# Patient Record
Sex: MALE | Race: WHITE | NOT HISPANIC OR LATINO | Employment: UNEMPLOYED | URBAN - NONMETROPOLITAN AREA
[De-identification: names, ages, dates, MRNs, and addresses within clinical notes are randomized per-mention and may not be internally consistent; named-entity substitution may affect disease eponyms.]

---

## 2017-11-05 ENCOUNTER — HOSPITAL ENCOUNTER (EMERGENCY)
Facility: HOSPITAL | Age: 42
Discharge: HOME | End: 2017-11-05
Attending: FAMILY MEDICINE | Admitting: FAMILY MEDICINE

## 2017-11-05 VITALS
HEIGHT: 65 IN | WEIGHT: 150 LBS | RESPIRATION RATE: 16 BRPM | DIASTOLIC BLOOD PRESSURE: 104 MMHG | OXYGEN SATURATION: 96 % | SYSTOLIC BLOOD PRESSURE: 190 MMHG | HEART RATE: 77 BPM | TEMPERATURE: 98 F | BODY MASS INDEX: 24.99 KG/M2

## 2017-11-05 DIAGNOSIS — S51.812A FOREARM LACERATION, LEFT, INITIAL ENCOUNTER: Primary | ICD-10-CM

## 2017-11-05 DIAGNOSIS — I10 HYPERTENSION, UNSPECIFIED TYPE: ICD-10-CM

## 2017-11-05 PROCEDURE — 99283 EMERGENCY DEPT VISIT LOW MDM: CPT

## 2017-11-05 RX ORDER — LIDOCAINE HYDROCHLORIDE 10 MG/ML
10 INJECTION, SOLUTION EPIDURAL; INFILTRATION; INTRACAUDAL; PERINEURAL ONCE
Status: COMPLETED | OUTPATIENT
Start: 2017-11-05 | End: 2017-11-05

## 2017-11-05 RX ORDER — CLONIDINE HYDROCHLORIDE 0.1 MG/1
0.1 TABLET ORAL ONCE
Status: COMPLETED | OUTPATIENT
Start: 2017-11-05 | End: 2017-11-05

## 2017-11-05 RX ADMIN — LIDOCAINE HYDROCHLORIDE 10 ML: 10 INJECTION, SOLUTION EPIDURAL; INFILTRATION; INTRACAUDAL; PERINEURAL at 20:38

## 2017-11-05 RX ADMIN — CLONIDINE HYDROCHLORIDE 0.1 MG: 0.1 TABLET ORAL at 20:38

## 2017-11-06 NOTE — ED PROVIDER NOTES
Subjective   Patient is a 42 y.o. male presenting with skin laceration.   History provided by:  Patient   used: No    Laceration   Location:  Shoulder/arm  Shoulder/arm laceration location:  L forearm  Length:  1 inch  Depth:  Through dermis  Quality: jagged    Bleeding: controlled with pressure    Time since incident:  1 hour  Injury mechanism: lost balance and fell into corner of mirror.  Pain details:     Quality:  Dull    Severity:  Moderate    Timing:  Constant    Progression:  Unchanged  Foreign body present:  No foreign bodies  Relieved by:  None tried  Worsened by:  Nothing  Ineffective treatments:  None tried  Tetanus status:  Up to date  Associated symptoms: no fever, no rash, no redness and no swelling        Review of Systems   Constitutional: Negative for activity change and fever.   HENT: Negative for congestion and sore throat.    Eyes: Negative for pain.   Respiratory: Negative for cough, shortness of breath and wheezing.    Cardiovascular: Negative for chest pain.   Gastrointestinal: Negative for abdominal distention, diarrhea, nausea and vomiting.   Genitourinary: Negative for difficulty urinating and dysuria.   Musculoskeletal: Negative for arthralgias and myalgias.   Skin: Positive for wound. Negative for rash.   Neurological: Negative for dizziness and headaches.   Psychiatric/Behavioral: Negative for agitation.   All other systems reviewed and are negative.      Past Medical History:   Diagnosis Date   • Hypertension        Allergies   Allergen Reactions   • Codeine        History reviewed. No pertinent surgical history.    History reviewed. No pertinent family history.    Social History     Social History   • Marital status:      Spouse name: N/A   • Number of children: N/A   • Years of education: N/A     Social History Main Topics   • Smoking status: Never Smoker   • Smokeless tobacco: Never Used   • Alcohol use No   • Drug use: No   • Sexual activity: Not Asked      Other Topics Concern   • None     Social History Narrative   • None           Objective   Physical Exam   Constitutional: He is oriented to person, place, and time. He appears well-developed and well-nourished.   HENT:   Head: Normocephalic and atraumatic.   Eyes: EOM are normal. Pupils are equal, round, and reactive to light.   Neck: Normal range of motion. Neck supple.   Cardiovascular: Normal rate, regular rhythm and normal heart sounds.    Pulmonary/Chest: Effort normal and breath sounds normal.   Abdominal: Soft. Bowel sounds are normal.   Musculoskeletal: Normal range of motion.   Neurological: He is alert and oriented to person, place, and time.   Skin: Skin is warm and dry. Laceration noted.        approx 1in laceration to volar aspect of mid left forearm. N/V intact.    Psychiatric: He has a normal mood and affect. His behavior is normal. Judgment and thought content normal.   Nursing note and vitals reviewed.      Laceration repair  Date/Time: 11/5/2017 9:27 PM  Performed by: CHRIS WHITAKER  Authorized by: WARREN BROWN   Consent: Verbal consent obtained. Written consent obtained.  Risks and benefits: risks, benefits and alternatives were discussed  Consent given by: patient  Patient understanding: patient states understanding of the procedure being performed  Body area: upper extremity  Location details: left lower arm  Laceration length: 2.6 cm  Foreign bodies: no foreign bodies  Tendon involvement: none  Nerve involvement: none  Vascular damage: no  Anesthesia: local infiltration    Anesthesia:  Local Anesthetic: lidocaine 1% without epinephrine    Sedation:  Patient sedated: no  Irrigation solution: saline  Irrigation method: syringe  Amount of cleaning: standard  Debridement: none  Degree of undermining: none  Skin closure: 4-0 nylon  Number of sutures: 7  Technique: simple  Approximation: close  Approximation difficulty: simple  Dressing: 4x4 sterile gauze and gauze roll  Patient  tolerance: Patient tolerated the procedure well with no immediate complications               ED Course  ED Course                  MDM  Number of Diagnoses or Management Options  Forearm laceration, left, initial encounter:   Hypertension, unspecified type:      Amount and/or Complexity of Data Reviewed  Clinical lab tests: ordered and reviewed  Tests in the radiology section of CPT®: reviewed and ordered  Tests in the medicine section of CPT®: reviewed and ordered    Patient Progress  Patient progress: stable      Final diagnoses:   Forearm laceration, left, initial encounter   Hypertension, unspecified type            RUFINO Crooks  11/05/17 9171

## 2018-09-11 ENCOUNTER — APPOINTMENT (OUTPATIENT)
Dept: GENERAL RADIOLOGY | Facility: HOSPITAL | Age: 43
End: 2018-09-11

## 2018-09-11 ENCOUNTER — APPOINTMENT (OUTPATIENT)
Dept: CT IMAGING | Facility: HOSPITAL | Age: 43
End: 2018-09-11

## 2018-09-11 ENCOUNTER — HOSPITAL ENCOUNTER (EMERGENCY)
Facility: HOSPITAL | Age: 43
Discharge: HOME | End: 2018-09-11
Attending: EMERGENCY MEDICINE | Admitting: EMERGENCY MEDICINE

## 2018-09-11 VITALS
BODY MASS INDEX: 24.99 KG/M2 | TEMPERATURE: 98 F | HEART RATE: 79 BPM | HEIGHT: 65 IN | RESPIRATION RATE: 16 BRPM | DIASTOLIC BLOOD PRESSURE: 99 MMHG | OXYGEN SATURATION: 99 % | WEIGHT: 150 LBS | SYSTOLIC BLOOD PRESSURE: 153 MMHG

## 2018-09-11 DIAGNOSIS — I10 HYPERTENSION, UNSPECIFIED TYPE: Primary | ICD-10-CM

## 2018-09-11 LAB
6-ACETYL MORPHINE: NEGATIVE
ALBUMIN SERPL-MCNC: 4.5 G/DL (ref 3.5–5)
ALBUMIN/GLOB SERPL: 1.3 G/DL (ref 1.5–2.5)
ALP SERPL-CCNC: 89 U/L (ref 40–129)
ALT SERPL W P-5'-P-CCNC: 25 U/L (ref 10–44)
AMPHET+METHAMPHET UR QL: POSITIVE
ANION GAP SERPL CALCULATED.3IONS-SCNC: 9.5 MMOL/L (ref 3.6–11.2)
AST SERPL-CCNC: 28 U/L (ref 10–34)
BARBITURATES UR QL SCN: NEGATIVE
BASOPHILS # BLD AUTO: 0.05 10*3/MM3 (ref 0–0.3)
BASOPHILS NFR BLD AUTO: 0.4 % (ref 0–2)
BENZODIAZ UR QL SCN: NEGATIVE
BILIRUB SERPL-MCNC: 0.4 MG/DL (ref 0.2–1.8)
BILIRUB UR QL STRIP: NEGATIVE
BUN BLD-MCNC: 15 MG/DL (ref 7–21)
BUN/CREAT SERPL: 14.6 (ref 7–25)
BUPRENORPHINE SERPL-MCNC: NEGATIVE NG/ML
CALCIUM SPEC-SCNC: 9.8 MG/DL (ref 7.7–10)
CANNABINOIDS SERPL QL: NEGATIVE
CHLORIDE SERPL-SCNC: 103 MMOL/L (ref 99–112)
CLARITY UR: CLEAR
CO2 SERPL-SCNC: 28.5 MMOL/L (ref 24.3–31.9)
COCAINE UR QL: NEGATIVE
COLOR UR: ABNORMAL
CREAT BLD-MCNC: 1.03 MG/DL (ref 0.43–1.29)
DEPRECATED RDW RBC AUTO: 42 FL (ref 37–54)
EOSINOPHIL # BLD AUTO: 0.16 10*3/MM3 (ref 0–0.7)
EOSINOPHIL NFR BLD AUTO: 1.2 % (ref 0–5)
ERYTHROCYTE [DISTWIDTH] IN BLOOD BY AUTOMATED COUNT: 13.6 % (ref 11.5–14.5)
ETHANOL BLD-MCNC: <10 MG/DL
ETHANOL UR QL: <0.01 %
GFR SERPL CREATININE-BSD FRML MDRD: 78 ML/MIN/1.73
GLOBULIN UR ELPH-MCNC: 3.5 GM/DL
GLUCOSE BLD-MCNC: 98 MG/DL (ref 70–110)
GLUCOSE UR STRIP-MCNC: NEGATIVE MG/DL
HCT VFR BLD AUTO: 52.5 % (ref 42–52)
HGB BLD-MCNC: 17.9 G/DL (ref 14–18)
HGB UR QL STRIP.AUTO: NEGATIVE
IMM GRANULOCYTES # BLD: 0.02 10*3/MM3 (ref 0–0.03)
IMM GRANULOCYTES NFR BLD: 0.2 % (ref 0–0.5)
KETONES UR QL STRIP: ABNORMAL
LEUKOCYTE ESTERASE UR QL STRIP.AUTO: NEGATIVE
LIPASE SERPL-CCNC: 197 U/L (ref 13–60)
LYMPHOCYTES # BLD AUTO: 4.63 10*3/MM3 (ref 1–3)
LYMPHOCYTES NFR BLD AUTO: 35.5 % (ref 21–51)
MCH RBC QN AUTO: 29 PG (ref 27–33)
MCHC RBC AUTO-ENTMCNC: 34.1 G/DL (ref 33–37)
MCV RBC AUTO: 85 FL (ref 80–94)
METHADONE UR QL SCN: NEGATIVE
MONOCYTES # BLD AUTO: 0.97 10*3/MM3 (ref 0.1–0.9)
MONOCYTES NFR BLD AUTO: 7.4 % (ref 0–10)
NEUTROPHILS # BLD AUTO: 7.23 10*3/MM3 (ref 1.4–6.5)
NEUTROPHILS NFR BLD AUTO: 55.3 % (ref 30–70)
NITRITE UR QL STRIP: NEGATIVE
OPIATES UR QL: NEGATIVE
OSMOLALITY SERPL CALC.SUM OF ELEC: 282.1 MOSM/KG (ref 273–305)
OXYCODONE UR QL SCN: NEGATIVE
PCP UR QL SCN: NEGATIVE
PH UR STRIP.AUTO: 5.5 [PH] (ref 5–8)
PLATELET # BLD AUTO: 314 10*3/MM3 (ref 130–400)
PMV BLD AUTO: 8.9 FL (ref 6–10)
POTASSIUM BLD-SCNC: 3.6 MMOL/L (ref 3.5–5.3)
PROT SERPL-MCNC: 8 G/DL (ref 6–8)
PROT UR QL STRIP: ABNORMAL
RBC # BLD AUTO: 6.18 10*6/MM3 (ref 4.7–6.1)
SODIUM BLD-SCNC: 141 MMOL/L (ref 135–153)
SP GR UR STRIP: 1.03 (ref 1–1.03)
TROPONIN I SERPL-MCNC: 0.03 NG/ML
UROBILINOGEN UR QL STRIP: ABNORMAL
WBC NRBC COR # BLD: 13.06 10*3/MM3 (ref 4.5–12.5)

## 2018-09-11 PROCEDURE — 93005 ELECTROCARDIOGRAM TRACING: CPT | Performed by: EMERGENCY MEDICINE

## 2018-09-11 PROCEDURE — 80307 DRUG TEST PRSMV CHEM ANLYZR: CPT | Performed by: EMERGENCY MEDICINE

## 2018-09-11 PROCEDURE — 96376 TX/PRO/DX INJ SAME DRUG ADON: CPT

## 2018-09-11 PROCEDURE — 71045 X-RAY EXAM CHEST 1 VIEW: CPT

## 2018-09-11 PROCEDURE — 71045 X-RAY EXAM CHEST 1 VIEW: CPT | Performed by: RADIOLOGY

## 2018-09-11 PROCEDURE — 70450 CT HEAD/BRAIN W/O DYE: CPT | Performed by: RADIOLOGY

## 2018-09-11 PROCEDURE — 83690 ASSAY OF LIPASE: CPT | Performed by: EMERGENCY MEDICINE

## 2018-09-11 PROCEDURE — 99284 EMERGENCY DEPT VISIT MOD MDM: CPT

## 2018-09-11 PROCEDURE — 70450 CT HEAD/BRAIN W/O DYE: CPT

## 2018-09-11 PROCEDURE — 85025 COMPLETE CBC W/AUTO DIFF WBC: CPT | Performed by: EMERGENCY MEDICINE

## 2018-09-11 PROCEDURE — 80053 COMPREHEN METABOLIC PANEL: CPT | Performed by: EMERGENCY MEDICINE

## 2018-09-11 PROCEDURE — 93010 ELECTROCARDIOGRAM REPORT: CPT | Performed by: INTERNAL MEDICINE

## 2018-09-11 PROCEDURE — 81003 URINALYSIS AUTO W/O SCOPE: CPT | Performed by: EMERGENCY MEDICINE

## 2018-09-11 PROCEDURE — 96374 THER/PROPH/DIAG INJ IV PUSH: CPT

## 2018-09-11 PROCEDURE — 84484 ASSAY OF TROPONIN QUANT: CPT | Performed by: EMERGENCY MEDICINE

## 2018-09-11 RX ORDER — METOPROLOL TARTRATE 50 MG/1
100 TABLET, FILM COATED ORAL ONCE
Status: COMPLETED | OUTPATIENT
Start: 2018-09-11 | End: 2018-09-11

## 2018-09-11 RX ORDER — AMLODIPINE BESYLATE 10 MG/1
10 TABLET ORAL DAILY
COMMUNITY

## 2018-09-11 RX ORDER — CLONIDINE HYDROCHLORIDE 0.1 MG/1
0.1 TABLET ORAL ONCE
Status: COMPLETED | OUTPATIENT
Start: 2018-09-11 | End: 2018-09-11

## 2018-09-11 RX ORDER — LABETALOL HYDROCHLORIDE 5 MG/ML
20 INJECTION, SOLUTION INTRAVENOUS ONCE
Status: COMPLETED | OUTPATIENT
Start: 2018-09-11 | End: 2018-09-11

## 2018-09-11 RX ORDER — METOPROLOL TARTRATE 100 MG/1
100 TABLET ORAL 2 TIMES DAILY
COMMUNITY

## 2018-09-11 RX ORDER — SODIUM CHLORIDE 0.9 % (FLUSH) 0.9 %
10 SYRINGE (ML) INJECTION AS NEEDED
Status: DISCONTINUED | OUTPATIENT
Start: 2018-09-11 | End: 2018-09-12 | Stop reason: HOSPADM

## 2018-09-11 RX ORDER — AMLODIPINE BESYLATE 10 MG/1
10 TABLET ORAL DAILY
Qty: 30 TABLET | Refills: 0 | Status: SHIPPED | OUTPATIENT
Start: 2018-09-11

## 2018-09-11 RX ORDER — METOPROLOL TARTRATE 100 MG/1
100 TABLET ORAL 2 TIMES DAILY
Qty: 60 TABLET | Refills: 0 | Status: SHIPPED | OUTPATIENT
Start: 2018-09-11

## 2018-09-11 RX ADMIN — LABETALOL HYDROCHLORIDE 20 MG: 5 INJECTION, SOLUTION INTRAVENOUS at 19:24

## 2018-09-11 RX ADMIN — CLONIDINE HYDROCHLORIDE 0.1 MG: 0.1 TABLET ORAL at 19:51

## 2018-09-11 RX ADMIN — METOPROLOL TARTRATE 100 MG: 50 TABLET, FILM COATED ORAL at 22:45

## 2018-09-11 RX ADMIN — LABETALOL HYDROCHLORIDE 20 MG: 5 INJECTION, SOLUTION INTRAVENOUS at 20:37

## 2018-09-12 NOTE — DISCHARGE INSTRUCTIONS
Home in care of family.  Medications as prescribed.  Follow-up with your family doctor in the next 2 days.  Return to the emergency Department right away if symptoms worsen/any problems.    Call one of the offices below to establish a primary care provider.  If you are unable to get an appointment and feel it is an emergency and need to be seen immediately please return to the Emergency Department.    Call one of the office below to set up a primary care provider.    Dr. Patricio Benson                                                                                                       [address] [facility] [address]  [phone]    Dr. Curry, Dr. CURT Gomez, Dr. YARA Gomez ([facility])  [address] [facility] [address]  [phone]    Dr. Julian, Dr. Hartman, Dr. Lui ([facility])  [address] [facility] [address]  [phone]    Dr. Yee  [address] [facility] [address]  [phone]    Dr. Archibald, Dr. Reece, Dr. Castellon, Dr. Moore ([facility])  [address] [place] [address] [address]  [facility] [address]  [phone]    Dr. Shayy Martinez  [address] [facility]  [place] [address]  [phone]    Dr. Chen Grijalva  [address]   [address]  [facility] [address]  [phone]    Dr. Benson  [address] [facility] [address]  [phone]    Dr. Rutherford, Dr. Bone  [address] [facility] [address]  [phone]    Dr. Cespedes  [address]                                                              [address]  [facility] [address]  [phone]    Dr. Covarrubias  [address] [facility] [address]  [phone]    Dr. Luz Jay  [address] [facility]  [address]  [facility] [address]  [phone]

## 2018-09-12 NOTE — ED PROVIDER NOTES
Subjective   Patient is a 43-year-old white male who presents today complaining of severe high blood pressure, a generalized throbbing headache.  He states that he has been feeling like this off and on now for the last 5 days.  He states that previously he was on Lopressor 100 mg twice daily and Norvasc 10 mg daily, but that he has not taken any of his blood pressure medication in approximately a year.  He states that he does not recall who the doctor was that he was seen that was riding his medications for him.  He reports that he has had some mild nausea but no vomiting.  He denies fever, chills, neck pain, chest pain, shortness of breath, abdominal pain, syncope or near syncope, focal numbness or weakness, other symptoms or other complaints.            Review of Systems   Constitutional: Negative for chills, diaphoresis and fever.   HENT: Negative for ear pain, sore throat and trouble swallowing.    Eyes: Negative for photophobia and pain.   Respiratory: Negative for shortness of breath, wheezing and stridor.    Cardiovascular: Negative for chest pain and palpitations.   Gastrointestinal: Negative for abdominal distention, abdominal pain, blood in stool, diarrhea, nausea and vomiting.   Endocrine: Negative for polydipsia and polyphagia.   Genitourinary: Negative for difficulty urinating and flank pain.   Musculoskeletal: Negative for back pain, neck pain and neck stiffness.   Skin: Negative for color change and pallor.   Neurological: Negative for seizures, syncope and speech difficulty.   Psychiatric/Behavioral: Negative for confusion.   All other systems reviewed and are negative.      Past Medical History:   Diagnosis Date   • Hypertension        Allergies   Allergen Reactions   • Codeine        History reviewed. No pertinent surgical history.    History reviewed. No pertinent family history.    Social History     Social History   • Marital status:      Social History Main Topics   • Smoking status:  Light Tobacco Smoker   • Smokeless tobacco: Never Used   • Alcohol use No   • Drug use: No   • Sexual activity: Defer     Other Topics Concern   • Not on file           Objective   Physical Exam   Constitutional: He is oriented to person, place, and time. He appears well-developed and well-nourished. No distress.   HENT:   Head: Normocephalic and atraumatic.   Eyes: Pupils are equal, round, and reactive to light. EOM are normal. No scleral icterus.   Neck: Normal range of motion. Neck supple. No neck rigidity. No tracheal deviation present.   Soft, supple, nontender, full range of painless motion, no meningeal signs.   Cardiovascular: Normal rate, regular rhythm and intact distal pulses.    Pulmonary/Chest: Effort normal and breath sounds normal. No respiratory distress. He exhibits no tenderness.   Abdominal: Soft. Bowel sounds are normal. There is no tenderness. There is no rebound and no guarding.   Musculoskeletal: Normal range of motion. He exhibits no tenderness.   Neurological: He is alert and oriented to person, place, and time. He has normal strength. No sensory deficit. He exhibits normal muscle tone. Coordination normal. GCS eye subscore is 4. GCS verbal subscore is 5. GCS motor subscore is 6.   Skin: Skin is warm and dry. Capillary refill takes less than 2 seconds. He is not diaphoretic. No cyanosis. No pallor.   Psychiatric: He has a normal mood and affect. His behavior is normal.   Nursing note and vitals reviewed.      Procedures  EKG shows sinus rhythm with rate 76.  Incomplete right bundle-branch block.  Nonspecific ST-T abnormality.  XR Chest 1 View   ED Interpretation   No apparent acute disease pending radiologist.      CT Head Without Contrast   ED Interpretation   Per virtual radiology, no evidence for acute intracranial abnormality.        Results for orders placed or performed during the hospital encounter of 09/11/18   Comprehensive Metabolic Panel   Result Value Ref Range    Glucose 98 70 -  110 mg/dL    BUN 15 7 - 21 mg/dL    Creatinine 1.03 0.43 - 1.29 mg/dL    Sodium 141 135 - 153 mmol/L    Potassium 3.6 3.5 - 5.3 mmol/L    Chloride 103 99 - 112 mmol/L    CO2 28.5 24.3 - 31.9 mmol/L    Calcium 9.8 7.7 - 10.0 mg/dL    Total Protein 8.0 6.0 - 8.0 g/dL    Albumin 4.50 3.50 - 5.00 g/dL    ALT (SGPT) 25 10 - 44 U/L    AST (SGOT) 28 10 - 34 U/L    Alkaline Phosphatase 89 40 - 129 U/L    Total Bilirubin 0.4 0.2 - 1.8 mg/dL    eGFR Non African Amer 78 >60 mL/min/1.73    Globulin 3.5 gm/dL    A/G Ratio 1.3 (L) 1.5 - 2.5 g/dL    BUN/Creatinine Ratio 14.6 7.0 - 25.0    Anion Gap 9.5 3.6 - 11.2 mmol/L   Lipase   Result Value Ref Range    Lipase 197 (H) 13 - 60 U/L   Urinalysis With Microscopic If Indicated (No Culture) - Urine, Clean Catch   Result Value Ref Range    Color, UA Dark Yellow (A) Yellow, Straw    Appearance, UA Clear Clear    pH, UA 5.5 5.0 - 8.0    Specific Gravity, UA 1.028 1.005 - 1.030    Glucose, UA Negative Negative    Ketones, UA Trace (A) Negative    Bilirubin, UA Negative Negative    Blood, UA Negative Negative    Protein, UA Trace (A) Negative    Leuk Esterase, UA Negative Negative    Nitrite, UA Negative Negative    Urobilinogen, UA 0.2 E.U./dL 0.2 - 1.0 E.U./dL   Troponin   Result Value Ref Range    Troponin I 0.025 <=0.040 ng/mL   Ethanol   Result Value Ref Range    Ethanol <10 <=10 mg/dL    Ethanol % <0.010 %   Urine Drug Screen - Urine, Clean Catch   Result Value Ref Range    Amphetamine Screen, Urine Positive (A) Negative    Barbiturates Screen, Urine Negative Negative    Benzodiazepine Screen, Urine Negative Negative    Cocaine Screen, Urine Negative Negative    Methadone Screen, Urine Negative Negative    Opiate Screen Negative Negative    Phencyclidine (PCP), Urine Negative Negative    THC, Screen, Urine Negative Negative    6-ACETYL MORPHINE Negative Negative    Buprenorphine, Screen, Urine Negative Negative    Oxycodone Screen, Urine Negative Negative   CBC Auto Differential    Result Value Ref Range    WBC 13.06 (H) 4.50 - 12.50 10*3/mm3    RBC 6.18 (H) 4.70 - 6.10 10*6/mm3    Hemoglobin 17.9 14.0 - 18.0 g/dL    Hematocrit 52.5 (H) 42.0 - 52.0 %    MCV 85.0 80.0 - 94.0 fL    MCH 29.0 27.0 - 33.0 pg    MCHC 34.1 33.0 - 37.0 g/dL    RDW 13.6 11.5 - 14.5 %    RDW-SD 42.0 37.0 - 54.0 fl    MPV 8.9 6.0 - 10.0 fL    Platelets 314 130 - 400 10*3/mm3    Neutrophil % 55.3 30.0 - 70.0 %    Lymphocyte % 35.5 21.0 - 51.0 %    Monocyte % 7.4 0.0 - 10.0 %    Eosinophil % 1.2 0.0 - 5.0 %    Basophil % 0.4 0.0 - 2.0 %    Immature Grans % 0.2 0.0 - 0.5 %    Neutrophils, Absolute 7.23 (H) 1.40 - 6.50 10*3/mm3    Lymphocytes, Absolute 4.63 (H) 1.00 - 3.00 10*3/mm3    Monocytes, Absolute 0.97 (H) 0.10 - 0.90 10*3/mm3    Eosinophils, Absolute 0.16 0.00 - 0.70 10*3/mm3    Basophils, Absolute 0.05 0.00 - 0.30 10*3/mm3    Immature Grans, Absolute 0.02 0.00 - 0.03 10*3/mm3   Osmolality, Calculated   Result Value Ref Range    Osmolality Calc 282.1 273.0 - 305.0 mOsm/kg                ED Course  ED Course as of Sep 11 2244   Tue Sep 11, 2018   2243 Patient's emergency department stay has been uneventful.  His blood pressures been much better, most recently 153/94.  He has been resting comfortably, voices that he feels much better.  We discussed his test results and his plan of care, and I stressed to him the importance of him taking his blood pressure medication and close follow-up with his primary care provider.  He voices understanding and agreement.  He is discharged home in care of family.  [CM]      ED Course User Index  [CM] Dima Montes MD                  [facility]      Final diagnoses:   Hypertension, unspecified type             Please note that portions of this note were completed with a voice recognition program. Efforts were made to edit the dictations, but occasionally words are mistranscribed.       Dima Montes MD  09/11/18 9485

## 2018-09-12 NOTE — ED NOTES
Provided patient with urinal. Informed patient the need for urine sample. Patient verbalized understanding. Patient unable to provide urine sample at this time. Will continue to encourage. Nurse aware.     René Eden  09/11/18 2126

## 2020-08-20 ENCOUNTER — TRANSCRIBE ORDERS (OUTPATIENT)
Dept: PHYSICAL THERAPY | Facility: CLINIC | Age: 45
End: 2020-08-20

## 2020-08-20 DIAGNOSIS — I63.119 CEREBRAL INFARCTION DUE TO EMBOLISM OF VERTEBRAL ARTERY, UNSPECIFIED BLOOD VESSEL LATERALITY (HCC): Primary | ICD-10-CM

## 2020-08-25 ENCOUNTER — TREATMENT (OUTPATIENT)
Dept: PHYSICAL THERAPY | Facility: CLINIC | Age: 45
End: 2020-08-25

## 2020-08-25 DIAGNOSIS — I63.119 CEREBRAL INFARCTION DUE TO EMBOLISM OF VERTEBRAL ARTERY, UNSPECIFIED BLOOD VESSEL LATERALITY (HCC): Primary | ICD-10-CM

## 2020-08-25 PROCEDURE — 97163 PT EVAL HIGH COMPLEX 45 MIN: CPT | Performed by: PHYSICAL THERAPIST

## 2020-08-25 NOTE — PROGRESS NOTES
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  Patient suffered a CVA on 2020 and was flown out to [facility].  He was discharged from hospital on 2020.  He has not received any rehabilitation since discharge; his father notes that they have been attempting to get home health, but have had difficulty getting home health set up.  He saw MD on 2020 and was referred to outpatient PT.  Patient notes weakness on the right side and some difficulty with transfers.  He states that he can not walk; he arrives to therapy in a wheelchair.    Pain  Current pain ratin  Location: R) UE, LE    Social Support  Patient lives at: ramp to enter home.  Lives with: parents    Patient Goals  Patient goals for therapy: improved balance, increased strength and independence with ADLs/IADLs             Objective          Neurological Testing     Sensation     Lumbar   Left   Intact: light touch    Right   Diminished: light touch    Reflexes   Left   Clonus sign: negative    Right   Clonus sign: positive    Strength/Myotome Testing     Left Hip   Planes of Motion   Flexion: 4+    Right Hip   Planes of Motion   Flexion: 2+    Left Knee   Flexion: 4+  Extension: 4+    Right Knee   Flexion: 3  Extension: 2+    Left Ankle/Foot   Dorsiflexion: 4+  Plantar flexion: 4+    Right Ankle/Foot   Dorsiflexion: 0  Plantar flexion: 0    Ambulation     Comments   148/98    Functional Assessment     Comments  Sit<>stand: min  assist  Stand pivot transfer: min assist  Sit<>supine transfer: min assist          Assessment & Plan     Assessment  Impairments: abnormal gait, abnormal muscle firing, abnormal muscle tone, abnormal or restricted ROM, activity intolerance, impaired balance, impaired physical strength, lacks appropriate home exercise program, pain with function, safety issue and weight-bearing intolerance  Assessment details: Patient is a 45 year old male who comes to physical therapy s/p CVA. The patient presents with decreased LE ROM, decreased LE strength, decreased endurance, difficulty with gait, and difficulty with balance. Patient required min assist for sit<>stand, stand pivot transfers, and bed mobility.  Patient displayed a clonus reflex at the right LE.  Initial evaluation was limited, secondary to elevated BP (148/98 mmHg).  Patient will benefit from skilled PT, so that he can can achieve maximum level of function and be at decreased risk for falls.     Prognosis: good  Functional Limitations: carrying objects, lifting, walking, pulling, pushing, uncomfortable because of pain, moving in bed, sitting, standing, stooping and unable to perform repetitive tasks  Goals  Plan Goals: Short Term Goals: 4 weeks  1. Pt will perform bed mobility with supervision to allow for improved independence.    2.  Patient will be independent/compliant with HEP.  3. Pt will perform sit<>stand transfers with minimal use of upper extremities and supervision to allow for improved independence.    Long Term Goals: 12 weeks  1. Pt will be able to ambulate for 100 ft with least restrictive assistive device and CGA to allow for improved community involvement.   2. Pt will score at least 35/56 on the Macedo Balance Scale for decreased risk of fall.  3. Pt will perform TUG in less than 30 seconds for improved community involvement.  4. Pt will be able to perform 10 LAQ with full right knee extension to show improved quad control for improved  function.        Plan  Therapy options: will be seen for skilled physical therapy services  Planned modality interventions: cryotherapy, electrical stimulation/Russian stimulation, TENS and thermotherapy (hydrocollator packs)  Planned therapy interventions: manual therapy, ADL retraining, motor coordination training, balance/weight-bearing training, neuromuscular re-education, orthotic fitting/training, postural training, soft tissue mobilization, fine motor coordination training, flexibility, functional ROM exercises, strengthening, gait training, stretching, home exercise program, therapeutic activities, IADL retraining, transfer training, joint mobilization and wheelchair management/propulsion training  Duration in visits: 20  Duration in weeks: 12  Treatment plan discussed with: patient, caregiver and family  Plan details: High Evaluation   62059  Re-evaluation   47528    Therapeutic exercise  17841  Therapeutic activity    00829  Neuromuscular re-education   68886  Manual therapy   94438  Gait training  38981    Attended e-stim  15055  Unattended e-stim (Private)  56272  Unattended e-stim (Medicaid/Medicare)     Moist heat/cryotherapy [id number]             Visit Diagnoses:    ICD-10-CM ICD-9-CM   1. 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, unspecified blood vessel laterality (CMS/[facility]) I63.119 433.21       Timed:  Manual Therapy:         mins  25459;  Therapeutic Exercise:         mins  77813;     Neuromuscular Kiana:        mins  66685;    Therapeutic Activity:          mins  57630;     Gait Training:           mins  93285;     Ultrasound:          mins  37161;    Electrical Stimulation:         mins  75457 ( );    Untimed:  Electrical Stimulation:         mins  67283 ( );  Mechanical Traction:         mins  86846;     Timed Treatment:      mins   Total Treatment:     50   mins    PT SIGNATURE: Trish Dias, PT   DATE TREATMENT INITIATED: 8/25/2020    Initial  Certification  Certification Period: 11/23/2020  I certify that the therapy services are furnished while this patient is under my care.  The services outlined above are required by this patient, and will be reviewed every 90 days.     PHYSICIAN: Rodrigo Dominguez MD      DATE:     Please sign and return via fax to [phone].. Thank you, [facility].

## 2020-09-05 ENCOUNTER — APPOINTMENT (OUTPATIENT)
Dept: CT IMAGING | Facility: HOSPITAL | Age: 45
End: 2020-09-05

## 2020-09-05 ENCOUNTER — HOSPITAL ENCOUNTER (EMERGENCY)
Facility: HOSPITAL | Age: 45
Discharge: HOME | End: 2020-09-05
Attending: EMERGENCY MEDICINE | Admitting: EMERGENCY MEDICINE

## 2020-09-05 ENCOUNTER — APPOINTMENT (OUTPATIENT)
Dept: GENERAL RADIOLOGY | Facility: HOSPITAL | Age: 45
End: 2020-09-05

## 2020-09-05 VITALS
HEIGHT: 65 IN | SYSTOLIC BLOOD PRESSURE: 148 MMHG | TEMPERATURE: 98 F | DIASTOLIC BLOOD PRESSURE: 103 MMHG | BODY MASS INDEX: 24.99 KG/M2 | HEART RATE: 66 BPM | RESPIRATION RATE: 16 BRPM | WEIGHT: 150 LBS | OXYGEN SATURATION: 96 %

## 2020-09-05 DIAGNOSIS — R10.84 GENERALIZED ABDOMINAL PAIN: Primary | ICD-10-CM

## 2020-09-05 DIAGNOSIS — K92.1 BLOODY STOOL: ICD-10-CM

## 2020-09-05 LAB
ALBUMIN SERPL-MCNC: 4.15 G/DL (ref 3.5–5.2)
ALBUMIN/GLOB SERPL: 1.5 G/DL
ALP SERPL-CCNC: 63 U/L (ref 39–117)
ALT SERPL W P-5'-P-CCNC: 18 U/L (ref 1–41)
ANION GAP SERPL CALCULATED.3IONS-SCNC: 12.5 MMOL/L (ref 5–15)
APTT PPP: 31.4 SECONDS (ref 25.6–35.3)
AST SERPL-CCNC: 16 U/L (ref 1–40)
BASOPHILS # BLD AUTO: 0.09 10*3/MM3 (ref 0–0.2)
BASOPHILS NFR BLD AUTO: 0.7 % (ref 0–1.5)
BILIRUB SERPL-MCNC: 0.4 MG/DL (ref 0–1.2)
BILIRUB UR QL STRIP: NEGATIVE
BUN SERPL-MCNC: 15 MG/DL (ref 6–20)
BUN/CREAT SERPL: 14.4 (ref 7–25)
CALCIUM SPEC-SCNC: 8.9 MG/DL (ref 8.6–10.5)
CHLORIDE SERPL-SCNC: 106 MMOL/L (ref 98–107)
CLARITY UR: CLEAR
CO2 SERPL-SCNC: 22.5 MMOL/L (ref 22–29)
COLOR UR: YELLOW
CREAT SERPL-MCNC: 1.04 MG/DL (ref 0.76–1.27)
CRP SERPL-MCNC: 0.21 MG/DL (ref 0–0.5)
D-LACTATE SERPL-SCNC: 0.7 MMOL/L (ref 0.5–2)
DEPRECATED RDW RBC AUTO: 43.8 FL (ref 37–54)
EOSINOPHIL # BLD AUTO: 0.4 10*3/MM3 (ref 0–0.4)
EOSINOPHIL NFR BLD AUTO: 2.9 % (ref 0.3–6.2)
ERYTHROCYTE [DISTWIDTH] IN BLOOD BY AUTOMATED COUNT: 13.3 % (ref 12.3–15.4)
ERYTHROCYTE [SEDIMENTATION RATE] IN BLOOD: 6 MM/HR (ref 0–15)
GFR SERPL CREATININE-BSD FRML MDRD: 77 ML/MIN/1.73
GLOBULIN UR ELPH-MCNC: 2.8 GM/DL
GLUCOSE SERPL-MCNC: 93 MG/DL (ref 65–99)
GLUCOSE UR STRIP-MCNC: NEGATIVE MG/DL
HCT VFR BLD AUTO: 48.6 % (ref 37.5–51)
HGB BLD-MCNC: 15.6 G/DL (ref 13–17.7)
HGB UR QL STRIP.AUTO: NEGATIVE
HOLD SPECIMEN: NORMAL
HOLD SPECIMEN: NORMAL
IMM GRANULOCYTES # BLD AUTO: 0.03 10*3/MM3 (ref 0–0.05)
IMM GRANULOCYTES NFR BLD AUTO: 0.2 % (ref 0–0.5)
INR PPP: 0.97 (ref 0.9–1.1)
KETONES UR QL STRIP: NEGATIVE
LEUKOCYTE ESTERASE UR QL STRIP.AUTO: NEGATIVE
LIPASE SERPL-CCNC: 24 U/L (ref 13–60)
LYMPHOCYTES # BLD AUTO: 3.42 10*3/MM3 (ref 0.7–3.1)
LYMPHOCYTES NFR BLD AUTO: 25.2 % (ref 19.6–45.3)
MAGNESIUM SERPL-MCNC: 2 MG/DL (ref 1.6–2.6)
MCH RBC QN AUTO: 28.4 PG (ref 26.6–33)
MCHC RBC AUTO-ENTMCNC: 32.1 G/DL (ref 31.5–35.7)
MCV RBC AUTO: 88.5 FL (ref 79–97)
MONOCYTES # BLD AUTO: 0.98 10*3/MM3 (ref 0.1–0.9)
MONOCYTES NFR BLD AUTO: 7.2 % (ref 5–12)
NEUTROPHILS NFR BLD AUTO: 63.8 % (ref 42.7–76)
NEUTROPHILS NFR BLD AUTO: 8.66 10*3/MM3 (ref 1.7–7)
NITRITE UR QL STRIP: NEGATIVE
NRBC BLD AUTO-RTO: 0 /100 WBC (ref 0–0.2)
NT-PROBNP SERPL-MCNC: 118.7 PG/ML (ref 0–450)
PH UR STRIP.AUTO: 6.5 [PH] (ref 5–8)
PHOSPHATE SERPL-MCNC: 3.5 MG/DL (ref 2.5–4.5)
PLATELET # BLD AUTO: 297 10*3/MM3 (ref 140–450)
PMV BLD AUTO: 9.2 FL (ref 6–12)
POTASSIUM SERPL-SCNC: 3.8 MMOL/L (ref 3.5–5.2)
PROT SERPL-MCNC: 6.9 G/DL (ref 6–8.5)
PROT UR QL STRIP: NEGATIVE
PROTHROMBIN TIME: 12.6 SECONDS (ref 11.9–14.1)
RBC # BLD AUTO: 5.49 10*6/MM3 (ref 4.14–5.8)
SODIUM SERPL-SCNC: 141 MMOL/L (ref 136–145)
SP GR UR STRIP: <=1.005 (ref 1–1.03)
TROPONIN T SERPL-MCNC: <0.01 NG/ML (ref 0–0.03)
UROBILINOGEN UR QL STRIP: NORMAL
WBC # BLD AUTO: 13.58 10*3/MM3 (ref 3.4–10.8)
WHOLE BLOOD HOLD SPECIMEN: NORMAL
WHOLE BLOOD HOLD SPECIMEN: NORMAL

## 2020-09-05 PROCEDURE — 84484 ASSAY OF TROPONIN QUANT: CPT | Performed by: EMERGENCY MEDICINE

## 2020-09-05 PROCEDURE — 85025 COMPLETE CBC W/AUTO DIFF WBC: CPT | Performed by: EMERGENCY MEDICINE

## 2020-09-05 PROCEDURE — 83880 ASSAY OF NATRIURETIC PEPTIDE: CPT | Performed by: EMERGENCY MEDICINE

## 2020-09-05 PROCEDURE — 86140 C-REACTIVE PROTEIN: CPT | Performed by: EMERGENCY MEDICINE

## 2020-09-05 PROCEDURE — 74176 CT ABD & PELVIS W/O CONTRAST: CPT

## 2020-09-05 PROCEDURE — 84100 ASSAY OF PHOSPHORUS: CPT | Performed by: EMERGENCY MEDICINE

## 2020-09-05 PROCEDURE — 25010000002 MORPHINE PER 10 MG: Performed by: EMERGENCY MEDICINE

## 2020-09-05 PROCEDURE — 36415 COLL VENOUS BLD VENIPUNCTURE: CPT

## 2020-09-05 PROCEDURE — 85610 PROTHROMBIN TIME: CPT | Performed by: EMERGENCY MEDICINE

## 2020-09-05 PROCEDURE — 83605 ASSAY OF LACTIC ACID: CPT | Performed by: EMERGENCY MEDICINE

## 2020-09-05 PROCEDURE — 83735 ASSAY OF MAGNESIUM: CPT | Performed by: EMERGENCY MEDICINE

## 2020-09-05 PROCEDURE — 83690 ASSAY OF LIPASE: CPT | Performed by: EMERGENCY MEDICINE

## 2020-09-05 PROCEDURE — 93005 ELECTROCARDIOGRAM TRACING: CPT | Performed by: EMERGENCY MEDICINE

## 2020-09-05 PROCEDURE — 96374 THER/PROPH/DIAG INJ IV PUSH: CPT

## 2020-09-05 PROCEDURE — 25010000002 ONDANSETRON PER 1 MG: Performed by: EMERGENCY MEDICINE

## 2020-09-05 PROCEDURE — 80053 COMPREHEN METABOLIC PANEL: CPT | Performed by: EMERGENCY MEDICINE

## 2020-09-05 PROCEDURE — 85730 THROMBOPLASTIN TIME PARTIAL: CPT | Performed by: EMERGENCY MEDICINE

## 2020-09-05 PROCEDURE — 99284 EMERGENCY DEPT VISIT MOD MDM: CPT

## 2020-09-05 PROCEDURE — 96375 TX/PRO/DX INJ NEW DRUG ADDON: CPT

## 2020-09-05 PROCEDURE — 85652 RBC SED RATE AUTOMATED: CPT | Performed by: EMERGENCY MEDICINE

## 2020-09-05 PROCEDURE — 81003 URINALYSIS AUTO W/O SCOPE: CPT | Performed by: EMERGENCY MEDICINE

## 2020-09-05 PROCEDURE — 71045 X-RAY EXAM CHEST 1 VIEW: CPT

## 2020-09-05 PROCEDURE — 93010 ELECTROCARDIOGRAM REPORT: CPT | Performed by: INTERNAL MEDICINE

## 2020-09-05 RX ORDER — PANTOPRAZOLE SODIUM 40 MG/1
40 TABLET, DELAYED RELEASE ORAL DAILY
Qty: 30 TABLET | Refills: 0 | Status: SHIPPED | OUTPATIENT
Start: 2020-09-05

## 2020-09-05 RX ORDER — SUCRALFATE ORAL 1 G/10ML
1 SUSPENSION ORAL
Qty: 400 ML | Refills: 0 | Status: SHIPPED | OUTPATIENT
Start: 2020-09-05 | End: 2020-09-15

## 2020-09-05 RX ORDER — ONDANSETRON 2 MG/ML
4 INJECTION INTRAMUSCULAR; INTRAVENOUS ONCE
Status: COMPLETED | OUTPATIENT
Start: 2020-09-05 | End: 2020-09-05

## 2020-09-05 RX ORDER — SODIUM CHLORIDE 0.9 % (FLUSH) 0.9 %
10 SYRINGE (ML) INJECTION AS NEEDED
Status: DISCONTINUED | OUTPATIENT
Start: 2020-09-05 | End: 2020-09-05 | Stop reason: HOSPADM

## 2020-09-05 RX ADMIN — SODIUM CHLORIDE 1000 ML: 9 INJECTION, SOLUTION INTRAVENOUS at 02:17

## 2020-09-05 RX ADMIN — MORPHINE SULFATE 4 MG: 4 INJECTION, SOLUTION INTRAMUSCULAR; INTRAVENOUS at 04:43

## 2020-09-05 RX ADMIN — ONDANSETRON 4 MG: 2 INJECTION INTRAMUSCULAR; INTRAVENOUS at 04:43

## 2020-09-05 NOTE — ED NOTES
Pt complaining of increased right upper abdominal pain; Dr. Dangelo aware     Pepe Dwyer, RN  09/05/20 9072

## 2020-09-05 NOTE — ED NOTES
Discharge teaching done with patient and his daughter, discharge papers and prescriptions given and pt to wait in room until  arrives.     Pepe Dwyer RN  09/05/20 2108

## 2020-09-09 NOTE — ED PROVIDER NOTES
Subjective   45-year-old male in the emergency department reporting abdominal pain and rectal bleeding x2 earlier today.  Came to the emergency department for further evaluation.  Denies nausea, vomiting, fever, or chills.  Significant past medical history of hypertension.  He reports his abdominal pain is a 5/10 at this time.      History provided by:  Patient   used: No        Review of Systems   Constitutional: Negative for activity change, appetite change, chills, diaphoresis, fatigue and fever.   HENT: Negative for congestion, ear pain and sore throat.    Eyes: Negative for redness.   Respiratory: Negative for cough, chest tightness, shortness of breath and wheezing.    Cardiovascular: Negative for chest pain, palpitations and leg swelling.   Gastrointestinal: Positive for abdominal pain. Negative for diarrhea, nausea and vomiting.   Genitourinary: Negative for dysuria and urgency.   Musculoskeletal: Negative for arthralgias, back pain, myalgias and neck pain.   Skin: Negative for pallor, rash and wound.   Neurological: Negative for dizziness, speech difficulty, weakness and headaches.   Psychiatric/Behavioral: Negative for agitation, behavioral problems, confusion and decreased concentration.   All other systems reviewed and are negative.      Past Medical History:   Diagnosis Date   • Hypertension        Allergies   Allergen Reactions   • Codeine        History reviewed. No pertinent surgical history.    No family history on file.    Social History     Socioeconomic History   • Marital status:      Spouse name: Not on file   • Number of children: Not on file   • Years of education: Not on file   • Highest education level: Not on file   Tobacco Use   • Smoking status: Light Tobacco Smoker   • Smokeless tobacco: Never Used   Substance and Sexual Activity   • Alcohol use: No   • Drug use: No   • Sexual activity: Defer           Objective   Physical Exam   Constitutional: He is  oriented to person, place, and time. He appears well-developed and well-nourished.  Non-toxic appearance. No distress.   HENT:   Head: Normocephalic and atraumatic.   Right Ear: External ear normal.   Left Ear: External ear normal.   Nose: Nose normal.   Mouth/Throat: Oropharynx is clear and moist and mucous membranes are normal. No oropharyngeal exudate. No tonsillar exudate.   Eyes: Pupils are equal, round, and reactive to light. Conjunctivae, EOM and lids are normal.   Neck: Normal range of motion and full passive range of motion without pain. Neck supple. No thyromegaly present.   Cardiovascular: Normal rate, regular rhythm, S1 normal, S2 normal, normal heart sounds, intact distal pulses and normal pulses.   Pulmonary/Chest: Effort normal and breath sounds normal. No tachypnea. No respiratory distress. He has no decreased breath sounds. He has no wheezes. He has no rales. He exhibits no tenderness.   Abdominal: Soft. Normal appearance and bowel sounds are normal. He exhibits no distension. There is no tenderness. There is no rebound and no guarding.   Musculoskeletal: Normal range of motion. He exhibits no edema, tenderness or deformity.   Lymphadenopathy:     He has no cervical adenopathy.   Neurological: He is alert and oriented to person, place, and time. He has normal strength. No cranial nerve deficit or sensory deficit. GCS eye subscore is 4. GCS verbal subscore is 5. GCS motor subscore is 6.   Skin: Skin is warm, dry and intact. No rash noted. He is not diaphoretic. No erythema. No pallor.   Psychiatric: He has a normal mood and affect. His speech is normal and behavior is normal. Judgment and thought content normal. Cognition and memory are normal.   Nursing note and vitals reviewed.      Procedures           ED Course  ED Course as of Sep 09 1238   Sat Sep 05, 2020   1870 IMPRESSION:     1. No clearly acute findings in the abdomen or pelvis.  2. No renal or ureteral stones. No hydronephrosis.  3.  Sigmoid diverticulosis.  4. Otherwise normal GI tract, including the appendix.   CT Abdomen Pelvis Without Contrast [ES]   0330 IMPRESSION:  No acute cardiopulmonary findings.   XR Chest 1 View [ES]   Wed Sep 09, 2020   1235 Vent. Rate : 062 BPM     Atrial Rate : 062 BPM     P-R Int : 154 ms          QRS Dur : 106 ms      QT Int : 444 ms       P-R-T Axes : 021 -07 076 degrees     QTc Int : 450 ms     Normal sinus rhythm  Minimal voltage criteria for LVH, may be normal variant  Borderline ECG  When compared with ECG of 11-SEP-2018 20:18,  Incomplete right bundle branch block is no longer present   ECG 12 Lead [ES]      ED Course User Index  [ES] Tavo Dangelo MD                                           MDM  Number of Diagnoses or Management Options  Bloody stool: new and requires workup  Generalized abdominal pain: new and requires workup     Amount and/or Complexity of Data Reviewed  Clinical lab tests: reviewed and ordered  Tests in the radiology section of CPT®: reviewed and ordered  Tests in the medicine section of CPT®: reviewed and ordered  Review and summarize past medical records: yes  Independent visualization of images, tracings, or specimens: yes    Risk of Complications, Morbidity, and/or Mortality  Presenting problems: moderate  Diagnostic procedures: moderate  Management options: moderate    Patient Progress  Patient progress: stable      Final diagnoses:   Generalized abdominal pain   Bloody stool            Tavo Dangelo MD  09/09/20 123

## 2021-06-10 ENCOUNTER — TREATMENT (OUTPATIENT)
Dept: PHYSICAL THERAPY | Facility: CLINIC | Age: 46
End: 2021-06-10

## 2021-06-10 DIAGNOSIS — I63.119 CEREBRAL INFARCTION DUE TO EMBOLISM OF VERTEBRAL ARTERY, UNSPECIFIED BLOOD VESSEL LATERALITY (HCC): Primary | ICD-10-CM

## 2021-06-10 DIAGNOSIS — R26.81 UNSTEADY GAIT: ICD-10-CM

## 2021-06-10 DIAGNOSIS — G81.91 RIGHT HEMIPARESIS (HCC): ICD-10-CM

## 2021-06-10 PROCEDURE — 97162 PT EVAL MOD COMPLEX 30 MIN: CPT | Performed by: PHYSICAL THERAPIST

## 2021-06-10 NOTE — PROGRESS NOTES
Physical Therapy Initial Evaluation and Plan of Care        Patient: Alexandro Gill   : 1975  Diagnosis/ICD-10 Code:  Unsteady gait [R26.81]  Referring practitioner: Rodrigo Dominguez, *  Date of Initial Visit: 6/10/2021  Today's Date: 6/10/2021  Patient seen for 1 sessions    Visit Diagnoses:    ICD-10-CM ICD-9-CM   1. 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)  I63.119 433.21   2. Unsteady gait  R26.81 781.2   3. Right hemiparesis (CMS/[facility])  G81.91 342.90            Subjective Questionnaire: DICKINSON 32/56;  TUG 54sec      Subjective Evaluation    History of Present Illness  Date of onset: 2020  Mechanism of injury: On 2020 the patient suffered a CVA that resulted in right HP.  The patient was flown to [place] and remained in the hospital for two weeks.  He was discharged home and received HHPT for three months with improved mobility and function.  The patient recently was evaluated by MD Dominguez and was advised to start out-patient therapy services.    Quality of life: good    Pain  Current pain rating: 3  At best pain rating: 3  At worst pain ratin  Location: right shoulder  Quality: dull ache    Hand dominance: right    Patient Goals  Patient goals for therapy: decreased pain, improved balance, decreased edema, increased strength, independence with ADLs/IADLs and return to sport/leisure activities             Objective          Postural Observations    Additional Postural Observation Details  Slumped posture; pt sitting in WC; pt noted to have hyper tone of right UE and LE    Strength/Myotome Testing     Left Hip   Planes of Motion   Flexion: 5    Right Hip   Planes of Motion   Flexion: 4-    Left Knee   Flexion: 5  Extension: 5    Right Knee   Flexion: 3+  Extension: 4-    Left Ankle/Foot   Dorsiflexion: 5  Plantar flexion: 5    Right Ankle/Foot   Dorsiflexion: 3  Plantar flexion: 3    Tests     Additional Tests Details  Impaired  right periferal vison  Unable to test finger to nose and CHICO on right UE        Ambulation     Comments   Pt amb with circumducted gait with cga and use of qc; pt noted to have decreased stance on right LE with foot drop and significant knee hyperextension.  Pt advised to contact his MD for bracing evaluation for foot drop and knee hyperextension.          Assessment & Plan     Assessment  Impairments: abnormal coordination, abnormal gait, abnormal muscle firing, abnormal muscle tone, abnormal or restricted ROM, activity intolerance, impaired balance, impaired physical strength, lacks appropriate home exercise program, pain with function and safety issue  Assessment details: Pt is a 45 y/o male referred to therapy following deficits from a CVA.  Pt presents with an impaired gait, impaired balance and pt scored a 32/56 on his DICKINSON score and a 54sec on his TUG test.  Therapy will follow for improved mobility and balance.  Prognosis: good  Functional Limitations: carrying objects, lifting, walking, pulling, moving in bed, standing, reaching overhead and unable to perform repetitive tasks  Goals  Plan Goals: STG 6 weeks    1 Pt will be instructed troy HEP.  2 Pt will be fitted for bracing to prevent foot drop and knee hyperextension.  3 Pt will improve his TUG to less than 45 seconds.  4 Pt will improve his DICKINSON to more than 35/56.    LTG 12 weeks    1 Pt will improve his TUG to less than 35 seconds.  2 Pt will improve his DICKINSON to 40 or greater.  3 Pt will demonstrate improved right quad stability with gait with noted improved stance and hip flexion on right.    Plan  Therapy options: will be seen for skilled physical therapy services  Planned modality interventions: cryotherapy, electrical stimulation/Russian stimulation, thermotherapy (hydrocollator packs) and ultrasound  Planned therapy interventions: abdominal trunk stabilization, ADL retraining, body mechanics training, flexibility, functional ROM exercises, gait  training, home exercise program, IADL retraining, joint mobilization, manual therapy, neuromuscular re-education, postural training, soft tissue mobilization, strengthening, stretching, therapeutic activities and orthotic fitting/training  Duration in visits: 20  Duration in weeks: 12  Treatment plan discussed with: patient  Plan details: Will follow for optimal gains.  Moderate Evaluation  54405  Re-evaluation   90629  Therapeutic exercise  46083  Therapeutic activity    67569  Neuromuscular re-education   61482  Manual therapy   68443  Gait training  06416  Unattended e-stim (Medicaid/Medicare)     Moist heat/cryotherapy 89986   Ultrasound   67070          Visit Diagnoses:    ICD-10-CM ICD-9-CM   1. Unsteady gait  R26.81 781.2   2. Right hemiparesis (CMS/[facility])  G81.91 342.90       Timed:  Manual Therapy:         mins  78642;  Therapeutic Exercise:         mins  50479;     Neuromuscular Kiana:        mins  07425;    Therapeutic Activity:          mins  02165;     Gait Training:           mins  75946;     Ultrasound:          mins  38315;    Electrical Stimulation:         mins  27221 ( );    Untimed:  Electrical Stimulation:         mins  65331 ( );  Mechanical Traction:         mins  85479;     Timed Treatment:      mins   Total Treatment:     45   mins    PT SIGNATURE: Denis Lozano PT         Initial Certification  Certification Period: 6/10/2021 thru 9/8/2021  I certify that the therapy services are furnished while this patient is under my care.  The services outlined above are required by this patient, and will be reviewed every 90 days.     PHYSICIAN: Rodrigo Dominguez MD      DATE:     Please sign and return via fax to [phone].. Thank you, [facility].

## 2021-06-16 ENCOUNTER — TREATMENT (OUTPATIENT)
Dept: PHYSICAL THERAPY | Facility: CLINIC | Age: 46
End: 2021-06-16

## 2021-06-16 DIAGNOSIS — G81.91 RIGHT HEMIPARESIS (HCC): ICD-10-CM

## 2021-06-16 DIAGNOSIS — G81.91 RIGHT HEMIPARESIS (HCC): Primary | ICD-10-CM

## 2021-06-16 DIAGNOSIS — R26.81 UNSTEADY GAIT: Primary | ICD-10-CM

## 2021-06-16 DIAGNOSIS — I63.119 CEREBRAL INFARCTION DUE TO EMBOLISM OF VERTEBRAL ARTERY, UNSPECIFIED BLOOD VESSEL LATERALITY (HCC): ICD-10-CM

## 2021-06-16 PROCEDURE — 97166 OT EVAL MOD COMPLEX 45 MIN: CPT | Performed by: OCCUPATIONAL THERAPIST

## 2021-06-16 PROCEDURE — 97110 THERAPEUTIC EXERCISES: CPT | Performed by: PHYSICAL THERAPIST

## 2021-06-16 PROCEDURE — 97116 GAIT TRAINING THERAPY: CPT | Performed by: PHYSICAL THERAPIST

## 2021-06-16 NOTE — PROGRESS NOTES
Outpatient Occupational Therapy Ortho Initial Evaluation    Patient: Alexandro Gill   : 1975  Diagnosis/ICD-10 Code:  Right hemiparesis (CMS/[facility]) [G81.91]  Referring practitioner: Rodrigo Dominguez, *  Date of Initial Visit: 2021  Today's Date: 2021  Patient seen for 1 sessions                         Subjective Evaluation    History of Present Illness  Mechanism of injury: Patient reports CVA 20 with right sided hemiparesis. Patient states he did not receive much therapy following CVA due to insurance and covid concerns.  He states he did receive some home health OT/PT for a short time.  He continues to have right sided weakness at this time and is now referred to outpatient therapy for OT/PT/SLP    Social Support  Lives in: one-story house  Lives with: parents and significant other    Hand dominance: right    Patient Goals  Patient goals for therapy: increased strength, independence with ADLs/IADLs and increased motion             Objective          Active Range of Motion   Left Shoulder   Normal active range of motion    Left Elbow   Normal active range of motion    Left Wrist   Normal active range of motion    Additional Active Range of Motion Details  Right shoulder 1/3 range     Min to no subluxation noted  Right elbow flex/ext 1/3 range    Tone noted with elbow ROM  Right wrist ext to neutral  Right grasp 1/2 range  Right active extension trace    Strength/Myotome Testing     Left Wrist/Hand      (2nd hand position)     Trial 1: 99 lbs    Trial 2: 99 lbs    Trial 3: 99 lbs    Average: 99 lbs    Thumb Strength  Key/Lateral Pinch     Trial 1: 0 lbs    Right Wrist/Hand      (2nd hand position)     Trial 1: 7 lbs    Trial 2: 7 lbs    Trial 3: 6 lbs    Average: 6.67 lbs    Thumb Strength   Key/Lateral Pinch     Trial 1: 0 lbs    Tests     Additional Tests Details  Unable to completed  F/GMC testing with right UE     General Comments     Wrist/Hand Comments  Patient demonstrates  min edema right hand, increased tone right UE.  Patient reports completing most basic self care with minimal to no assistance at this time.  He reports some changes with vision at times as well as numbness in right UE.  Deep touch pressure and temp grossly WFL         Assessment & Plan     Assessment  Impairments: abnormal coordination, abnormal or restricted ROM, impaired physical strength and lacks appropriate home exercise program  Assessment details: Patient presents with decreased right UE functional use for ADL and self care.    Prognosis: good    Goals  Plan Goals: STG  1.  Patient will increase right  3-5 lbs  2.  Patient will improve right shoulder ROM to 1/2 range.  3.  Patient will increase right hand ROM to grasp and release large objects consistently.  4  Patient will be independent with ROM HEP    LTG  1.  Patient will be independent with HEP as appropriate.  2. Patient will increase right functional use, strength and coordination to use as active assist with self care/ADL.  3.  Patient will     Plan  Therapy options: will be seen for skilled physical therapy services  Planned modality interventions: electrical stimulation/Russian stimulation  Planned therapy interventions: neuromuscular re-education, motor coordination training, fine motor coordination training, flexibility, functional ROM exercises, strengthening, stretching, therapeutic activities and home exercise program  Frequency: 2x week  Duration in visits: 20  Treatment plan discussed with: patient  Plan details: OT as planned for right UE function.        Timed Codes        Manual Therapy:         mins  87178;    Therapeutic Exercise:         mins  38109;     Neuromuscular Kiana:        mins  48309;    Therapeutic Activity:          mins  04192;      Ultrasound:          mins  53404;    Community/ work         mins  33642  Self Care/ Pat         mins  61450  Sensory Re-Int.                  mins   30540  Cognitve Re-train         mins   74533     Un-timed Codes  Electrical Stimulation:         mins 9 33529 ( );  OT eval mod complex     45   mins  59669      Timed Treatment:   45   mins   Total Treatment:     50   mins    OT SIGNATURE: Lluvia To, GIOVANI   DATE TREATMENT INITIATED: 6/16/2021    Initial Certification  Certification Period: 9/14/2021  I certify that the therapy services are furnished while this patient is under my care.  The services outlined above are required by this patient, and will be reviewed every 90 days.     PHYSICIAN: Rodrigo Dominguez MD      DATE:     Please sign and return via fax to [phone].. Thank you, [facility] Occupational Therapy.

## 2021-06-16 NOTE — PROGRESS NOTES
Physical Therapy Daily Treatment Note      Patient: Alexandro Gill   : 1975  Referring practitioner: Rodrigo Dominguez, *  Date of Initial Visit: Type: THERAPY  Noted: 6/10/2021  Today's Date: 2021  Patient seen for 2 sessions         Subjective:  Patient arrives to therapy today w/ his father.  Pt states of no new complaints upon arrival.        Objective   See Exercise, Manual, and Modality Logs for complete treatment.       Assessment/Plan:  Patient tolerated treatment well today and demonstrated good effort.  Session initiated w/ therex as listed f/b standing balance activities and gait training.  Exercise performed w/ focus on improved bilateral LE strength and functional mobility, as well as improved wt shift onto involved side.  Pt provided w/ cues and demonstration for proper form and postural awareness.  Gait training performed w/ swedish knee cage on right knee to assist w/ decrease in knee hyper extension and 3 point assistive device.  Pt provided w/ rest breaks as needed.  Pt continues to benefit from therapy services and will be progressed as tolerated.  Continue w/ PT's POC.     Visit Diagnoses:    ICD-10-CM ICD-9-CM   1. Unsteady gait  R26.81 781.2   2. Right hemiparesis (CMS/[facility])  G81.91 342.90   3. 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, unspecified blood vessel laterality (CMS/[facility])  I63.119 433.21       Progress per Plan of Care and Progress strengthening /stabilization /functional activity           Timed:  Manual Therapy:         mins  91028;  Therapeutic Exercise:    36     mins  28783;     Neuromuscular Kiana:        mins  30528;    Therapeutic Activity:          mins  74188;     Gait Trainin     mins  50255;     Ultrasound:          mins  03146;    Electrical Stimulation:         mins  73406 ( );    Untimed:  Electrical Stimulation:         mins  73477 ( );  Mechanical Traction:         mins  58723;     Timed Treatment:   45   mins    Total Treatment:    45    mins  Jeannie Dover. ISIDRO Sandoval  Physical Therapist

## 2021-06-23 ENCOUNTER — TREATMENT (OUTPATIENT)
Dept: PHYSICAL THERAPY | Facility: CLINIC | Age: 46
End: 2021-06-23

## 2021-06-23 ENCOUNTER — OFFICE VISIT (OUTPATIENT)
Dept: PHYSICAL THERAPY | Facility: CLINIC | Age: 46
End: 2021-06-23

## 2021-06-23 DIAGNOSIS — I63.119 CEREBRAL INFARCTION DUE TO EMBOLISM OF VERTEBRAL ARTERY, UNSPECIFIED BLOOD VESSEL LATERALITY (HCC): Primary | ICD-10-CM

## 2021-06-23 DIAGNOSIS — G81.91 RIGHT HEMIPARESIS (HCC): Primary | ICD-10-CM

## 2021-06-23 DIAGNOSIS — R47.1 DYSARTHRIA: ICD-10-CM

## 2021-06-23 DIAGNOSIS — G81.91 RIGHT HEMIPARESIS (HCC): ICD-10-CM

## 2021-06-23 DIAGNOSIS — I63.119 CEREBRAL INFARCTION DUE TO EMBOLISM OF VERTEBRAL ARTERY, UNSPECIFIED BLOOD VESSEL LATERALITY (HCC): ICD-10-CM

## 2021-06-23 DIAGNOSIS — R26.81 UNSTEADY GAIT: Primary | ICD-10-CM

## 2021-06-23 PROCEDURE — 97032 APPL MODALITY 1+ESTIM EA 15: CPT | Performed by: OCCUPATIONAL THERAPIST

## 2021-06-23 PROCEDURE — 92523 SPEECH SOUND LANG COMPREHEN: CPT | Performed by: SPEECH-LANGUAGE PATHOLOGIST

## 2021-06-23 PROCEDURE — 97116 GAIT TRAINING THERAPY: CPT | Performed by: PHYSICAL THERAPIST

## 2021-06-23 PROCEDURE — 97110 THERAPEUTIC EXERCISES: CPT | Performed by: PHYSICAL THERAPIST

## 2021-06-23 PROCEDURE — 97112 NEUROMUSCULAR REEDUCATION: CPT | Performed by: OCCUPATIONAL THERAPIST

## 2021-06-23 NOTE — PROGRESS NOTES
Physical Therapy Daily Treatment Note      Patient: Alexandro Gill   : 1975  Referring practitioner: Rodrigo Dominguez, *  Date of Initial Visit: Type: THERAPY  Noted: 6/10/2021  Today's Date: 2021  Patient seen for 3 sessions         Subjective:  Patient arrives to therapy today w/ his family.  Pt states he tolerated last session well w/ no complaints.     Objective   See Exercise, Manual, and Modality Logs for complete treatment.       Assessment/Plan:  Patient tolerated treatment well today, and demonstrated good effort.  Pt completed LE strengthening activities in seated and standing position f/b gait training w/ assistive device and static balance exercises.  Swedish knee cage donned on right LE to assist w/ decreased knee hyper extension.  Pt required continual cues during session for improved weight shift and awareness of involved side.  Pt provided w/ contact guard to min assist while performing balance exercise for improved patient safety and to reduce fall risk.  Pt continues to benefit from therapy services and will be progressed as tolerated.  Continue w/ PT's POC.     Visit Diagnoses:    ICD-10-CM ICD-9-CM   1. Unsteady gait  R26.81 781.2   2. Right hemiparesis (CMS/[facility])  G81.91 342.90   3. 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, unspecified blood vessel laterality (CMS/[facility])  I63.119 433.21       Progress per Plan of Care and Progress strengthening /stabilization /functional activity           Timed:  Manual Therapy:         mins  33729;  Therapeutic Exercise:    22     mins  16579;     Neuromuscular Kiana:   9     mins  25236;    Therapeutic Activity:          mins  84402;     Gait Trainin     mins  47144;     Ultrasound:          mins  82199;    Electrical Stimulation:         mins  33137 ( );    Untimed:  Electrical Stimulation:         mins  15698 ( );  Mechanical Traction:         mins  79863;     Timed Treatment:  45    mins   Total  Treatment:    45    mins  Jeannie Dover. ISIDRO Sandoval  Physical Therapist

## 2021-06-23 NOTE — PROGRESS NOTES
OccupationalTherapy Daily Progress Note        Patient: Alexandro Gill   : 1975  Diagnosis/ICD-10 Code:  Right hemiparesis (CMS/[facility]) [G81.91]  Referring practitioner: Rodrigo Dominguez, *  Date of Initial Visit: Type: THERAPY  Noted: 2021  Today's Date: 2021  Patient seen for 2 sessions         Aelxandro Gill reports:   Patient states he feels good today and ready to work    Subjective     Objective   See Exercise, Manual, and Modality Logs for complete treatment.       Assessment/Plan  NMES right wrist and hand to improve functional grasp and release.  PROM and stretch, facilitation to right elbow and forearm with resisted ROM.  AAROM with arm skate right .  Patient tolerated therapy well with no complaints today.  Patient demonstrated good response to NMES>    Progress per Plan of Care  Continue OT as planned for right UE           Timed Codes        Manual Therapy:           mins  39615;    Therapeutic Exercise:           mins  77064;     Neuromuscular Kiana:  30        mins  51496;    Therapeutic Activity:          mins  43175;      Ultrasound:          mins  51463;    Community/ work         mins  54567  Self Care/ Pat         mins  91613  Sensory Re-Int.                  mins   74211  Cognitve Re-train         mins  36542   OT estim attended       15    mins   90987  Un-timed Codes  Electrical Stimulation:         mins 9 97224 ( );      Timed Treatment:   45   mins   Total Treatment:     50   mins    Lluvia To OT  Occupational Therapist

## 2021-06-23 NOTE — PROGRESS NOTES
"Outpatient Speech Language Pathology   Adult Speech Language Cognitive Initial Evaluation       Patient Name: Alexandro Gill  : 1975  MRN: [id number]  Today's Date: 2021        Visit Date: 2021   There is no problem list on file for this patient.       Past Medical History:   Diagnosis Date   • Hypertension         No past surgical history on file.      Visit Dx:    ICD-10-CM ICD-9-CM   1. 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 Dysarthria  R47.1 784.51           Alexandro Gill  is seen this pm to participate in a formal s/l and cognitive evaluation to assess safety/function in adls. Pt is positioned upright and centered to cooperatively participate. All results and observations of this evaluation are felt to be representative of pt's current functional status.    Pt reports CVA occurred 2020 where he was flown to [place] where he stayed 11 days. He was discharged home 20. He attended OP PT 20 however due to COVID and pt father health, pt then transitioned to home health.  He reports x4 home health ST visits. He then reports he was d/c. He began OP therapy 2021.  He reports initially he had difficulty chewing and swallowing, however reports dysphagia has subsided. He reports he has gained weight and is able to eat anything \"except chips\".  He reports mild R facial weakness.  He reports dental difficulties including broken tooth also negatively impact chewing abilities. He reports he had vision evaluation early this week and reports 20/20 vision, however occasionally has double or triple vision.  He reports h/o seizure, hypertension, heartburn/reflux. He reports he had dx of duodenum ulcer during teenage years s/t alcohol consumption.    Alexandro reports he lives w/ [facility] and has 5 children and expecting baby in September.  He reports his main concern for speech therapy is STM and speech " "abilities.  He serves as informant for today's evaluation independently.          Social History     Socioeconomic History   • Marital status:      Spouse name: Not on file   • Number of children: Not on file   • Years of education: Not on file   • Highest education level: Not on file   Tobacco Use   • Smoking status: Light Tobacco Smoker   • Smokeless tobacco: Never Used   Substance and Sexual Activity   • Alcohol use: No   • Drug use: No   • Sexual activity: Defer        Diet Orders (active) (From admission, onward)    None          Pt tolerates RA    Receptive language skills are intact. Pt is able to id common objects and personal body parts, follow simple and multi-step directives, understand complex \"wh\" questions and participate in conversational exchange w/o difficulties.    Expressive language skills are mildly impaired. Pt is able to complete automatic speech tasks, confrontation naming tasks, complete complex oe sentences, respond to complete oe \"wh\" questions, repeat at word/sentence and multiple digit levels, as well as participate in complex conversational exchange. No aphasia or verbal apraxia evidenced. Mild anomia intermittently.     Pt is independently oriented to person, place and time. Immediate, STM and LTM are wfl w/ pt recalling recent adls and providing personal information w/o delays. Thought organization, processing and problem solving are wfl w/ pt demonstrating appropriate comparing/contrasting, understanding of idiomatic language, convergent and divergent thinking skills. Pt reports concerns for STM however WFL during today's evaluation. Will continue to monitor and address if warranted.     Facial/oral structures are symmetrical upon observation, however mild R weakness. Oral mucosa are moist, pink and clean. Secretions are clear, thin and well controlled. OROM/CHLOE is mildly weak w/ mild R lingual deviation upon protrusion. Speech intensity, clarity and intelligibility are " impaired w/ mild-moderate dysarthria, however no oral apraxia noted.    Graphic and reading skills are intact, premorbid baseline status. Pt is able to id isolated letters, simple and moderate words, as well as sentences and small paragraphs. Signature is legible.    Pragmatic skills are wfl w/ appropriate eye gaze patterns and visual tracking. Language is appropriate in context w/ topic initiation and maintenance independently. No left neglect evidenced. Humor response is intact w/ appropriate affect.    Impression: Mild expressive, mild-moderate dysarthria    Recommendations: Further formal SLP f/u recommended for s/l and cognitive skills.           Long Term Goal:  1. Patient will improve expressive language skills to allow for maximal functional communication and independence in adls.              Short Term Goals:  1. Pt will tolerate facial massage to facial surface for 3-7 minutes to increase surface blood flow, sensation and ROM over 3 consecutive sessions.   2. Pt will improve word finding in conversation 4/5 opp w/ min cues over 3 sessions.   3. Pt will name objects/verbs per verbal description w/ 80% acc given min cues over 3 sessions.   4. Pt will decrease presence of dysarthria at conversation level in 4/5 opp w/ min cues over 3 sessions.  5. Pt will complete complex word finding tasks in 4/5 opp w/ min cues over 3 sessions    6. Pt will complete convergent/divergent naming/thinking tasks in  3/5 opp w/ min cues over 3 sessions          D/w pt results and recommendations w/ verbal understanding and agreement.      Thank you for allowing me to participate in the care of your patient-  Harmony Garibay MA CCC-SLP            Functional Tool: Adult Speech Language Cognitive Evaluation      OP SLP Assessment/Plan - 06/23/21 1400        SLP Assessment    Functional Problems  Speech Language- Adult/Cognition   -KB    Impact on Function: Adult Speech Language/Cognition  Difficulty communicating wants, needs, and  ideas   -KB    Clinical Impression: Speech Language-Adult/Congnition  Mild-Moderate:;Dysarthria- Mixed;Mild:;Expressive Aphasia   -KB    Please refer to paper survey for additional self-reported information  Yes   -KB    Please refer to items scanned into chart for additional diagnostic informaiton and handouts as provided by clinician  Yes   -KB    SLP Diagnosis  Mild-Moderate:;Dysarthria- Mixed;Mild:;Expressive Aphasia   -KB    Prognosis  Good (comment)   -KB    Patient/caregiver participated in establishment of treatment plan and goals  Yes   -KB    Patient would benefit from skilled therapy intervention  Yes   -KB       SLP Plan    Frequency  12 visits   -KB    Duration  12 weeks   -KB    Planned CPT's?  SLP INDIVIDUAL SPEECH THERAPY: 36363   -KB    Plan Comments  Initiate POC   -KB      User Key  (r) = Recorded By, (t) = Taken By, (c) = Cosigned By    Initials Name Provider Type    Risa Mata MA,CCC-SLP Speech and Language Pathologist                     Risa Garibay MA,CCC-SLP  6/23/2021

## 2021-06-28 ENCOUNTER — TRANSCRIBE ORDERS (OUTPATIENT)
Dept: PHYSICAL THERAPY | Facility: CLINIC | Age: 46
End: 2021-06-28

## 2021-06-28 DIAGNOSIS — Z46.89 WHEELCHAIR FITTING OR ADJUSTMENT: Primary | ICD-10-CM

## 2021-06-29 ENCOUNTER — TREATMENT (OUTPATIENT)
Dept: PHYSICAL THERAPY | Facility: CLINIC | Age: 46
End: 2021-06-29

## 2021-06-29 DIAGNOSIS — I63.119 CEREBRAL INFARCTION DUE TO EMBOLISM OF VERTEBRAL ARTERY, UNSPECIFIED BLOOD VESSEL LATERALITY (HCC): Primary | ICD-10-CM

## 2021-06-29 DIAGNOSIS — R47.1 DYSARTHRIA: ICD-10-CM

## 2021-06-29 DIAGNOSIS — G81.91 RIGHT HEMIPARESIS (HCC): Primary | ICD-10-CM

## 2021-06-29 DIAGNOSIS — G81.91 RIGHT HEMIPARESIS (HCC): ICD-10-CM

## 2021-06-29 DIAGNOSIS — R26.81 UNSTEADY GAIT: ICD-10-CM

## 2021-06-29 PROCEDURE — 97110 THERAPEUTIC EXERCISES: CPT | Performed by: PHYSICAL THERAPIST

## 2021-06-29 PROCEDURE — 97112 NEUROMUSCULAR REEDUCATION: CPT | Performed by: PHYSICAL THERAPIST

## 2021-06-29 PROCEDURE — 97032 APPL MODALITY 1+ESTIM EA 15: CPT | Performed by: OCCUPATIONAL THERAPIST

## 2021-06-29 PROCEDURE — 97112 NEUROMUSCULAR REEDUCATION: CPT | Performed by: OCCUPATIONAL THERAPIST

## 2021-06-29 NOTE — PROGRESS NOTES
OccupationalTherapy Daily Progress Note        Patient: Alexandro Gill   : 1975  Diagnosis/ICD-10 Code:  Right hemiparesis (CMS/HCC) [G81.91]  Referring practitioner: Rodrigo Dominguez, *  Date of Initial Visit: Type: THERAPY  Noted: 2021  Today's Date: 2021  Patient seen for 3 sessions         Alexandro Gill reports: patient reports no complaints today    Subjective     Objective   See Exercise, Manual, and Modality Logs for complete treatment.       Assessment/Plan  NMES right wrist and hand to facilitate functional grasp and release right hand.  PROM and stretch right UE.  Facilitation and vibration to right elbow , forearm completed.  Arm skate activity completed for ROM and strengthening right shoulder.  Patient tolerated therapy well today with no complaints.     Progress per Plan of Care  Continue OT as planned for right UE facilitation.           Timed Codes        Manual Therapy:         mins  96380;    Therapeutic Exercise:         mins  78135;     Neuromuscular Kiana:   30     mins  22454;    Therapeutic Activity:          mins  43771;      Ultrasound:          mins  98436;    Community/ work         mins  65992  Self Care/ Pat         mins  95750  Sensory Re-Int.                  mins   81635  Cognitve Re-train         mins  45962   OT estim attended        15   mins   89269  Un-timed Codes  Electrical Stimulation:         mins 9 22758 ( );      Timed Treatment:  45    mins   Total Treatment:      50  mins    Lluvia To OT  Occupational Therapist

## 2021-06-29 NOTE — PROGRESS NOTES
Physical Therapy Daily Treatment Note      Patient: Alexandro iGll   : 1975  Referring practitioner: Rodrigo Dominguez, *  Date of Initial Visit: Type: THERAPY  Noted: 6/10/2021  Today's Date: 2021  Patient seen for 4 sessions         Alexandro Gill reports that he is having no pain or complaints. Patient states that he is walking at home with no assistive device and no knee cage.    Objective   See Exercise, Manual, and Modality Logs for complete treatment.       Assessment/Plan  Patient tolerated treatment session well with rest breaks taken as needed by the patient. Patient demonstrated difficulty with weight shifting and balance activities. Verbal cues needed on proper technique and form of exercises. Gait performed with krystyna walker; cues needed with weight shifting, step length, step width, and capri of gait. No adverse reactions with treatment session. Continue per PT's POC, progress exercises and balance activities.    Visit Diagnoses:    ICD-10-CM ICD-9-CM   1. 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 Dysarthria  R47.1 784.51   3. Right hemiparesis (CMS/[facility])  G81.91 342.90   4. Unsteady gait  R26.81 781.2       Progress strengthening /stabilization /functional activity           Timed:  Manual Therapy:         mins  19591;  Therapeutic Exercise:     15    mins  96825;     Neuromuscular Kiana:    26    mins  93682;    Therapeutic Activity:          mins  84873;     Gait Training:           mins  61413;     Ultrasound:          mins  18119;    Electrical Stimulation:         mins  45485 ( );    Untimed:  Electrical Stimulation:         mins  37697 ( );  Mechanical Traction:         mins  71112;     Timed Treatment:   41   mins   Total Treatment:     41   mins  Claudia Gonzalez PTA

## 2021-06-30 ENCOUNTER — TREATMENT (OUTPATIENT)
Dept: PHYSICAL THERAPY | Facility: CLINIC | Age: 46
End: 2021-06-30

## 2021-06-30 DIAGNOSIS — G81.91 RIGHT HEMIPARESIS (HCC): Primary | ICD-10-CM

## 2021-06-30 DIAGNOSIS — R47.1 DYSARTHRIA: ICD-10-CM

## 2021-06-30 DIAGNOSIS — I63.119 CEREBRAL INFARCTION DUE TO EMBOLISM OF VERTEBRAL ARTERY, UNSPECIFIED BLOOD VESSEL LATERALITY (HCC): ICD-10-CM

## 2021-06-30 DIAGNOSIS — I63.119 CEREBRAL INFARCTION DUE TO EMBOLISM OF VERTEBRAL ARTERY, UNSPECIFIED BLOOD VESSEL LATERALITY (HCC): Primary | ICD-10-CM

## 2021-06-30 DIAGNOSIS — R26.81 UNSTEADY GAIT: ICD-10-CM

## 2021-06-30 PROCEDURE — 97032 APPL MODALITY 1+ESTIM EA 15: CPT | Performed by: OCCUPATIONAL THERAPIST

## 2021-06-30 PROCEDURE — 97112 NEUROMUSCULAR REEDUCATION: CPT | Performed by: PHYSICAL THERAPIST

## 2021-06-30 PROCEDURE — 97112 NEUROMUSCULAR REEDUCATION: CPT | Performed by: OCCUPATIONAL THERAPIST

## 2021-06-30 PROCEDURE — 92507 TX SP LANG VOICE COMM INDIV: CPT | Performed by: SPEECH-LANGUAGE PATHOLOGIST

## 2021-06-30 PROCEDURE — 97110 THERAPEUTIC EXERCISES: CPT | Performed by: PHYSICAL THERAPIST

## 2021-06-30 PROCEDURE — 97116 GAIT TRAINING THERAPY: CPT | Performed by: PHYSICAL THERAPIST

## 2021-06-30 NOTE — PROGRESS NOTES
Physical Therapy Daily Treatment Note      Patient: Alexandro Gill   : 1975  Referring practitioner: Rodrigo Dominguez, *  Date of Initial Visit: Type: THERAPY  Noted: 6/10/2021  Today's Date: 2021  Patient seen for 5 sessions         Subjective:  Patient arrives to therapy today w/ father.  Pt states he had muscle soreness following last session, however he notes tolerable.      Objective   See Exercise, Manual, and Modality Logs for complete treatment.       Assessment/Plan:  Patient responded well to today's session and demonstrated good effort.  Pt provided w/ intermittent rest breaks due to fatigue.  Treatment initiated w/ seated and supine LE/ core strengthening activities f/b closed chain balance exercises and gait training.  Pt required verbal and tactile cues while performing standing activities for improved weight shift onto involved side, and postural awareness.  Pt completed gait training w/ HW and swedish knee cage donned, f/b gait training w/ SBQC.  Pt provided w/ contact guard assist.  Pt noted w/ intermittent episodes of spasticity during standing exercises.  Pt continues to benefit from therapy services and will be progressed as tolerated.  Continue w/ PT's POC.      Visit Diagnoses:    ICD-10-CM ICD-9-CM   1. Right hemiparesis (CMS/[facility])  G81.91 342.90   2. Unsteady gait  R26.81 781.2   3. 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, unspecified blood vessel laterality (CMS/[facility])  I63.119 433.21       Progress per Plan of Care and Progress strengthening /stabilization /functional activity           Timed:  Manual Therapy:         mins  42010;  Therapeutic Exercise:     20    mins  19438;     Neuromuscular Kiana:   16     mins  72004;    Therapeutic Activity:          mins  62639;     Gait Training:      10     mins  99727;     Ultrasound:          mins  79428;    Electrical Stimulation:         mins  97169 ( );    Untimed:  Electrical Stimulation:         mins   48582 ( );  Mechanical Traction:         mins  66659;     Timed Treatment:  46    mins   Total Treatment:    46    mins  Jeannie Dover. ISIDRO Sandoval  Physical Therapist

## 2021-06-30 NOTE — PROGRESS NOTES
OccupationalTherapy Daily Progress Note        Patient: Alexandro Gill   : 1975  Diagnosis/ICD-10 Code:  Right hemiparesis (CMS/HCC) [G81.91]  Referring practitioner: Rodrigo Dominguez, *  Date of Initial Visit: Type: THERAPY  Noted: 2021  Today's Date: 2021  Patient seen for 4 sessions         Alexandro Gill reports: patient has no complaints today      Subjective     Objective   See Exercise, Manual, and Modality Logs for complete treatment.       Assessment/Plan  NMES right wrist and hand grasp and extension completed.  PROM right UE completed with facilitation/vibration and resistance for elbow.  Patient completed AAROM with arm skate for right shoulder ROM.  Grasp and release with right hand/gripper to increase functional use. Patient tolerated therapy well with increased shoulder ROM noted today.  Patient notes right UE feels looser today    Progress per Plan of Care  Continue OT as planned for right UE           Timed Codes        Manual Therapy:         mins  26412;    Therapeutic Exercise:         mins  16680;     Neuromuscular Kiana: 35       mins  96869;    Therapeutic Activity:          mins  40052;      Ultrasound:          mins  03340;    Community/ work         mins  47622  Self Care/ Pat         mins  26585  Sensory Re-Int.                  mins   02588  Cognitve Re-train         mins  15500   OT estim attended       15   mins   74802  Un-timed Codes  Electrical Stimulation:         mins 9 18310 ( );      Timed Treatment:   50   mins   Total Treatment:     55   mins    Lluvia To OT  Occupational Therapist

## 2021-06-30 NOTE — PROGRESS NOTES
"Outpatient Speech Language Pathology   Adult Speech Language Cognitive Treatment Note       Patient Name: Alexandro Gill  : 1975  MRN: [id number]  Today's Date: 2021         Visit Date: 2021   There is no problem list on file for this patient.         Visit Dx:    ICD-10-CM ICD-9-CM   1. 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 Dysarthria  R47.1 784.51                Long Term Goal:  1. Patient will improve expressive language skills to allow for maximal functional communication and independence in adls.              Short Term Goals:  1. Pt will tolerate facial massage to facial surface for 3-7 minutes to increase surface blood flow, sensation and ROM over 3 consecutive sessions.   *not directly addressed     2. Pt will improve word finding in conversation 4/5 opp w/ min cues over 3 sessions.   *pt improves word finding in structured/unstructured tasks in 18/20 opp w/ minimal cue    3. Pt will name objects/verbs per verbal description w/ 80% acc given min cues over 3 sessions.   *pt names objects per verbal description in 16/20 opp w/ min cues and picture cards    4. Pt will decrease presence of dysarthria at conversation level in 4/5 opp w/ min cues over 3 sessions.  *pt demonstrates mild-mod difficulty w/ \"sh\" productions at sentence/conversation levels; mild difficulty w/ /s/; use of tongue twisters, SH loaded sentences, multiple syllabic words    5. Pt will complete complex word finding tasks in 4/5 opp w/ min cues over 3 sessions    *pt improves word finding in structured/unstructured tasks in 18/20 opp w/ minimal cue    6. Pt will complete convergent/divergent naming/thinking tasks in  3/5 opp w/ min cues over 3 sessions   *pt completes divergent naming w/ basic concept categories w/ avg of 5 items w/ min-mod cues           D/w pt results and recommendations w/ verbal understanding and agreement.        Thank " you for allowing me to participate in the care of your patient-  Harmony Garibay MA CCC-SLP               Risa Garibay MA,CCC-SLP  6/30/2021

## 2021-07-07 ENCOUNTER — TREATMENT (OUTPATIENT)
Dept: PHYSICAL THERAPY | Facility: CLINIC | Age: 46
End: 2021-07-07

## 2021-07-07 DIAGNOSIS — R47.1 DYSARTHRIA: ICD-10-CM

## 2021-07-07 DIAGNOSIS — I63.119 CEREBRAL INFARCTION DUE TO EMBOLISM OF VERTEBRAL ARTERY, UNSPECIFIED BLOOD VESSEL LATERALITY (HCC): Primary | ICD-10-CM

## 2021-07-07 DIAGNOSIS — R26.81 UNSTEADY GAIT: ICD-10-CM

## 2021-07-07 DIAGNOSIS — I63.119 CEREBRAL INFARCTION DUE TO EMBOLISM OF VERTEBRAL ARTERY, UNSPECIFIED BLOOD VESSEL LATERALITY (HCC): ICD-10-CM

## 2021-07-07 DIAGNOSIS — G81.91 RIGHT HEMIPARESIS (HCC): Primary | ICD-10-CM

## 2021-07-07 PROCEDURE — 97112 NEUROMUSCULAR REEDUCATION: CPT | Performed by: PHYSICAL THERAPIST

## 2021-07-07 PROCEDURE — 97110 THERAPEUTIC EXERCISES: CPT | Performed by: PHYSICAL THERAPIST

## 2021-07-07 PROCEDURE — 97116 GAIT TRAINING THERAPY: CPT | Performed by: PHYSICAL THERAPIST

## 2021-07-07 PROCEDURE — 92507 TX SP LANG VOICE COMM INDIV: CPT | Performed by: SPEECH-LANGUAGE PATHOLOGIST

## 2021-07-07 NOTE — PROGRESS NOTES
"Outpatient Speech Language Pathology   Adult Speech Language Cognitive Treatment Note       Patient Name: Alexandro Gill  : 1975  MRN: [id number]  Today's Date: 2021         Visit Date: 2021   There is no problem list on file for this patient.         Visit Dx:    ICD-10-CM ICD-9-CM   1. 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 Dysarthria  R47.1 784.51       Pt arrives for session; in wheelchair in lobby waiting for session. Pt wears mask.        Long Term Goal:  1. Patient will improve expressive language skills to allow for maximal functional communication and independence in adls.              Short Term Goals:  1. Pt will tolerate facial massage to facial surface for 3-7 minutes to increase surface blood flow, sensation and ROM over 3 consecutive sessions.   *not directly addressed      2. Pt will improve word finding in conversation 4/5 opp w/ min cues over 3 sessions.   *pt improves word finding in structured/unstructured tasks in /25 opp w/ minimal cues     3. Pt will name objects/verbs per verbal description w/ 80% acc given min cues over 3 sessions.   *pt names objects per verbal description in 10/100 opp w/ min cues      4. Pt will decrease presence of dysarthria at conversation level in 4/5 opp w/ min cues over 3 sessions.  *pt demonstrates mild difficulty w/ \"sh\" productions at sentence/conversation levels; mild difficulty w/ /s/; use of s-loaded sentences, SH loaded sentences, multiple syllabic words     5. Pt will complete complex word finding tasks in 4/5 opp w/ min cues over 3 sessions    *pt improves word finding in structured/unstructured tasks in /25 opp w/ minimal cue     6. Pt will complete convergent/divergent naming/thinking tasks in  3/5 opp w/ min cues over 3 sessions   *pt completes divergent naming w/ basic concept categories w/ avg of 5 items w/ min-mod cues           D/w pt results " and recommendations w/ verbal understanding and agreement. Plan for 3-4 more session pending progress.         Thank you for allowing me to participate in the care of your patient-  Harmony Garibay MA CCC-SLP                  Risa Garibay MA,CCC-SLP  7/7/2021

## 2021-07-16 ENCOUNTER — TREATMENT (OUTPATIENT)
Dept: PHYSICAL THERAPY | Facility: CLINIC | Age: 46
End: 2021-07-16

## 2021-07-16 DIAGNOSIS — R47.1 DYSARTHRIA: ICD-10-CM

## 2021-07-16 DIAGNOSIS — G81.91 RIGHT HEMIPARESIS (HCC): Primary | ICD-10-CM

## 2021-07-16 DIAGNOSIS — I63.119 CEREBRAL INFARCTION DUE TO EMBOLISM OF VERTEBRAL ARTERY, UNSPECIFIED BLOOD VESSEL LATERALITY (HCC): Primary | ICD-10-CM

## 2021-07-16 DIAGNOSIS — G81.91 RIGHT HEMIPARESIS (HCC): ICD-10-CM

## 2021-07-16 DIAGNOSIS — R26.81 UNSTEADY GAIT: ICD-10-CM

## 2021-07-16 PROCEDURE — 97116 GAIT TRAINING THERAPY: CPT | Performed by: PHYSICAL THERAPIST

## 2021-07-16 PROCEDURE — 97110 THERAPEUTIC EXERCISES: CPT | Performed by: PHYSICAL THERAPIST

## 2021-07-16 PROCEDURE — 97112 NEUROMUSCULAR REEDUCATION: CPT | Performed by: OCCUPATIONAL THERAPIST

## 2021-07-16 PROCEDURE — 97112 NEUROMUSCULAR REEDUCATION: CPT | Performed by: PHYSICAL THERAPIST

## 2021-07-16 PROCEDURE — 97032 APPL MODALITY 1+ESTIM EA 15: CPT | Performed by: OCCUPATIONAL THERAPIST

## 2021-07-16 NOTE — PROGRESS NOTES
OccupationalTherapy Daily Progress Note        Patient: Alexandro Gill   : 1975  Diagnosis/ICD-10 Code:  Right hemiparesis (CMS/HCC) [G81.91]  Referring practitioner: Rodrigo Dominguez, *  Date of Initial Visit: Type: THERAPY  Noted: 2021  Today's Date: 2021  Patient seen for 5 sessions         Alexandro Gill reports: patient states he thinks his arm is improving      Subjective     Objective   See Exercise, Manual, and Modality Logs for complete treatment.       Assessment/Plan  NMES right wrist and digit to increase grasp and active extension.  PROM and stretch right UE.  Neuro facilitation with vibration and tapping performed to right elbow and wrist.  Patient complete AROM activity with arm skate, grasp and  strengthening with handgripper. Patient tolerated therapy well, patient demonstrates increasng right UE functional use.    Progress per Plan of Care  Continue OT as planned for right UE           Timed Codes        Manual Therapy:         mins  65076;    Therapeutic Exercise:         mins  80279;     Neuromuscular Kiana:  35      mins  04807;    Therapeutic Activity:          mins  33904;      Ultrasound:          mins  89828;    Community/ work         mins  57312  Self Care/ Pat         mins  53250  Sensory Re-Int.                  mins   07864  Cognitve Re-train         mins  00511   OT estim attended       15    mins   97684  Un-timed Codes  Electrical Stimulation:         mins 9 83500 ( );      Timed Treatment:  50    mins   Total Treatment:     55   mins    Lluvia To OT  Occupational Therapist

## 2021-07-16 NOTE — PROGRESS NOTES
Physical Therapy Daily Treatment Note      Patient: Alexandro Gill   : 1975  Referring practitioner: Rodrigo Dominguez, *  Date of Initial Visit: Type: THERAPY  Noted: 6/10/2021  Today's Date: 2021  Patient seen for 7 sessions         Alexandro Gill reports that he isn't having any pain or concerns prior to treatment session. Patient states that he is trying to get into [facility] to have therapy every day.      Objective   See Exercise, Manual, and Modality Logs for complete treatment.       Assessment/Plan  Patient tolerated treatment session well with rest breaks taken as needed by the patient. Educated patient to perform therex per his tolerance, patient verbalized understanding. Patient continues to demonstrate decreased weight bearing on the right LE. Patient ambulated with SBQC with knee brace donned right knee. Continue per PT's POC, progress exercises per the patient's tolerance.    Visit Diagnoses:    ICD-10-CM ICD-9-CM   1. 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 Dysarthria  R47.1 784.51   3. Right hemiparesis (CMS/[facility])  G81.91 342.90   4. Unsteady gait  R26.81 781.2       Progress strengthening /stabilization /functional activity           Timed:  Manual Therapy:         mins  63890;  Therapeutic Exercise:   14      mins  42848;     Neuromuscular Kiana:    14    mins  96757;    Therapeutic Activity:          mins  14006;     Gait Trainin     mins  49579;     Ultrasound:          mins  95967;    Electrical Stimulation:         mins  23105 ( );    Untimed:  Electrical Stimulation:         mins  69701 ( );  Mechanical Traction:         mins  51465;     Timed Treatment:  40    mins   Total Treatment:     40   mins  Claudia Gonzalez PTA

## 2021-07-20 ENCOUNTER — TREATMENT (OUTPATIENT)
Dept: PHYSICAL THERAPY | Facility: CLINIC | Age: 46
End: 2021-07-20

## 2021-07-20 DIAGNOSIS — R26.81 UNSTEADY GAIT: ICD-10-CM

## 2021-07-20 DIAGNOSIS — G81.91 RIGHT HEMIPARESIS (HCC): Primary | ICD-10-CM

## 2021-07-20 DIAGNOSIS — I63.119 CEREBRAL INFARCTION DUE TO EMBOLISM OF VERTEBRAL ARTERY, UNSPECIFIED BLOOD VESSEL LATERALITY (HCC): ICD-10-CM

## 2021-07-20 PROCEDURE — 97032 APPL MODALITY 1+ESTIM EA 15: CPT | Performed by: OCCUPATIONAL THERAPIST

## 2021-07-20 PROCEDURE — 97530 THERAPEUTIC ACTIVITIES: CPT | Performed by: PHYSICAL THERAPIST

## 2021-07-20 PROCEDURE — 97112 NEUROMUSCULAR REEDUCATION: CPT | Performed by: PHYSICAL THERAPIST

## 2021-07-20 PROCEDURE — 97116 GAIT TRAINING THERAPY: CPT | Performed by: PHYSICAL THERAPIST

## 2021-07-20 PROCEDURE — 97112 NEUROMUSCULAR REEDUCATION: CPT | Performed by: OCCUPATIONAL THERAPIST

## 2021-07-20 NOTE — PROGRESS NOTES
Physical Therapy Daily Treatment Note      Patient: Alexandro Gill   : 1975  Referring practitioner: Rodrigo Dominguez, *  Date of Initial Visit: Type: THERAPY  Noted: 6/10/2021  Today's Date: 2021  Patient seen for 8 sessions         Alexandro Gill reports that he is having no pain prior to treatment session. Patient states that he is working on walking at home.      Objective   See Exercise, Manual, and Modality Logs for complete treatment.       Assessment/Plan  Patient tolerated treatment session well with rest breaks taken as needed by the patient. Patient demonstrated weakness in the right knee and hip. Difficulty noted at times when performing balance activities. Educated patient to perform therex per his tolerance, patient verbalized understanding. Patient's right knee continues to hyperextend when fatigued during session; knee brace donned on right knee. Continue per PT's POC, progress exercises per the patient's tolerance.    Visit Diagnoses:    ICD-10-CM ICD-9-CM   1. Right hemiparesis (CMS/[facility])  G81.91 342.90   2. 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 Unsteady gait  R26.81 781.2       Progress strengthening /stabilization /functional activity           Timed:  Manual Therapy:         mins  41978;  Therapeutic Exercise:         mins  92586;     Neuromuscular Kiana:  22      mins  52084;    Therapeutic Activity:     10     mins  10475;     Gait Training:      10     mins  78732;     Ultrasound:          mins  19248;    Electrical Stimulation:         mins  75211 ( );    Untimed:  Electrical Stimulation:         mins  78433 ( );  Mechanical Traction:         mins  44926;     Timed Treatment:   42   mins   Total Treatment:     42   mins  Claudia Gonzalez PTA

## 2021-07-20 NOTE — PROGRESS NOTES
OccupationalTherapy Daily Progress Note        Patient: Alexandro Gill   : 1975  Diagnosis/ICD-10 Code:  Right hemiparesis (CMS/HCC) [G81.91]  Referring practitioner: Rodrigo Dominguez, *  Date of Initial Visit: Type: THERAPY  Noted: 2021  Today's Date: 2021  Patient seen for 6 sessions         Alexandro Gill reports: patient states he is working his arm at home    Subjective     Objective   See Exercise, Manual, and Modality Logs for complete treatment.       Assessment/Plan patient tolerated therapy well today.   NMES right wrist and hand to increase functional grasp and release.  PROM and stretch right UE, facilitation with vibration and tapping right elbow and forearm.  Patient completed AROM with arm skate, right  with handgripper completed.  Patient demonstrating increased grasp and elbow ROM.    Progress per Plan of Care  Continue OT as planned for right UE           Timed Codes        Manual Therapy:         mins  03543;    Therapeutic Exercise:         mins  89451;     Neuromuscular Kiana:  30      mins  87302;    Therapeutic Activity:          mins  75402;      Ultrasound:          mins  13033;    Community/ work         mins  52202  Self Care/ Pat         mins  35341  Sensory Re-Int.                  mins   95220  Cognitve Re-train         mins  80813   OT estim attended        15   mins   21759  Un-timed Codes  Electrical Stimulation:         mins 9 10232 ( );      Timed Treatment:  45    mins   Total Treatment:     50  mins    Lluvia To OT  Occupational Therapist

## 2021-07-21 ENCOUNTER — TREATMENT (OUTPATIENT)
Dept: PHYSICAL THERAPY | Facility: CLINIC | Age: 46
End: 2021-07-21

## 2021-07-21 DIAGNOSIS — I63.119 CEREBRAL INFARCTION DUE TO EMBOLISM OF VERTEBRAL ARTERY, UNSPECIFIED BLOOD VESSEL LATERALITY (HCC): Primary | ICD-10-CM

## 2021-07-21 DIAGNOSIS — G81.91 RIGHT HEMIPARESIS (HCC): Primary | ICD-10-CM

## 2021-07-21 DIAGNOSIS — R47.1 DYSARTHRIA: ICD-10-CM

## 2021-07-21 DIAGNOSIS — R26.81 UNSTEADY GAIT: ICD-10-CM

## 2021-07-21 PROCEDURE — 92507 TX SP LANG VOICE COMM INDIV: CPT | Performed by: SPEECH-LANGUAGE PATHOLOGIST

## 2021-07-21 PROCEDURE — 97032 APPL MODALITY 1+ESTIM EA 15: CPT | Performed by: OCCUPATIONAL THERAPIST

## 2021-07-21 PROCEDURE — 97116 GAIT TRAINING THERAPY: CPT | Performed by: PHYSICAL THERAPIST

## 2021-07-21 PROCEDURE — 97112 NEUROMUSCULAR REEDUCATION: CPT | Performed by: OCCUPATIONAL THERAPIST

## 2021-07-21 PROCEDURE — 97110 THERAPEUTIC EXERCISES: CPT | Performed by: PHYSICAL THERAPIST

## 2021-07-21 PROCEDURE — 97530 THERAPEUTIC ACTIVITIES: CPT | Performed by: PHYSICAL THERAPIST

## 2021-07-21 NOTE — PROGRESS NOTES
OccupationalTherapy Daily Progress Note        Patient: Alexandro Gill   : 1975  Diagnosis/ICD-10 Code:  Right hemiparesis (CMS/HCC) [G81.91]  Referring practitioner: Rodrigo Dominguez, *  Date of Initial Visit: Type: THERAPY  Noted: 2021  Today's Date: 2021  Patient seen for 7 sessions         Alexandro Gill reports: patient states he worked hard yesterday and is tired today. Patient states his arm is more stiff today as well.   Subjective     Objective   See Exercise, Manual, and Modality Logs for complete treatment.       Assessment/Plan NMES right wrist and hand to increase grasp and release skills.  PROM and stretch right UE with vibration and tapping.  AROM with arm skate,  and grasp/release with handgripper.  Patient demonstrated some increased tone today right UE, with mild clonus.  Patient tolerated well .    Progress per Plan of Care  Continue OT for right UE           Timed Codes        Manual Therapy:         mins  81402;    Therapeutic Exercise:         mins  41566;     Neuromuscular Kiana:   30     mins  61031;    Therapeutic Activity:          mins  91875;      Ultrasound:          mins  98400;    Community/ work         mins  08707  Self Care/ Pat         mins  44468  Sensory Re-Int.                  mins   42408  Cognitve Re-train         mins  62392   OT estim attended        15  mins   96535  Un-timed Codes  Electrical Stimulation:         mins 9 58754 ( );      Timed Treatment:   45   mins   Total Treatment:    50    mins    Lluvia To, OT  Occupational Therapist

## 2021-07-21 NOTE — PROGRESS NOTES
Physical Therapy Daily Treatment Note      Patient: Alexandro Gill   : 1975  Referring practitioner: Rodrigo Dominguez, *  Date of Initial Visit: Type: THERAPY  Noted: 6/10/2021  Today's Date: 2021  Patient seen for 9 sessions         Subjective:  Patient arrives to therapy today w/ family.  Pt states he is a little tired from therapy yesterday, and working outside at home.  Otherwise pt states of no new complaints/ changes.      Objective   See Exercise, Manual, and Modality Logs for complete treatment.       Assessment/Plan:  Patient responded well to today's session w/ minimal rest break required.  Pt performed seated strengthening exercises f/b standing balance, gait and stair training activities.  Pt continues to utilize swedish knee brace during treatment to decrease knee hyper extension.  Pt required frequent cues throughout tx for improved weight shift onto involved side, and trusting of right side.  Exercise progressed w/ LE bike and TG squats initiated; pt observed w/ good tolerance.  Pt utilized SBQC during gait, and treatment.  Pt continues to benefit from therapy services and will be progressed as tolerated.  Continue w/ PT's POC.     Visit Diagnoses:    ICD-10-CM ICD-9-CM   1. Right hemiparesis (CMS/HCC)  G81.91 342.90   2. Unsteady gait  R26.81 781.2       Progress per Plan of Care and Progress strengthening /stabilization /functional activity           Timed:  Manual Therapy:         mins  76070;  Therapeutic Exercise:    20     mins  32882;     Neuromuscular Kiana:        mins  14604;    Therapeutic Activity:     12     mins  62857;     Gait Trainin     mins  56492;     Ultrasound:          mins  36154;    Electrical Stimulation:         mins  76227 ( );    Untimed:  Electrical Stimulation:         mins  94852 ( );  Mechanical Traction:         mins  18302;     Timed Treatment:  46    mins   Total Treatment:    46    mins  Jeannie Dover. Lori  PTA  Physical Therapist

## 2021-07-21 NOTE — PROGRESS NOTES
"Outpatient Speech Language Pathology   Adult Speech Language Cognitive Progress Note       Patient Name: Alexandro Gill  : 1975  MRN: [id number]  Today's Date: 2021         Visit Date: 2021   There is no problem list on file for this patient.         Visit Dx:    ICD-10-CM ICD-9-CM   1. 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 Dysarthria  R47.1 784.51         Pt arrives for session; in lobby waiting for session. Pt wears mask. Reports difficulty w/ pronunciation for \"pharmacist\" in generalization and cont difficulty w/ /s/ words.          Long Term Goal:  1. Patient will improve expressive language skills to allow for maximal functional communication and independence in adls.              Short Term Goals:  1. Pt will tolerate facial massage to facial surface for 3-7 minutes to increase surface blood flow, sensation and ROM over 3 consecutive sessions.   *not directly addressed      2. Pt will improve word finding in conversation 4/5 opp w/ min cues over 3 sessions.   *pt improves word finding in structured/unstructured tasks in / opp w/ minimal cues     3. Pt will name objects/verbs per verbal description w/ 80% acc given min cues over 3 sessions.   *pt names objects per verbal description in 10/10 opp w/ min cues      4. Pt will decrease presence of dysarthria at conversation level in 4/5 opp w/ min cues over 3 sessions.  *pt demonstrates mild difficulty w/ \"sh\", /s/, and s-blend productions at sentence/conversation levels; use of s-loaded sentences, tongue twisters, multiple syllabic words     5. Pt will complete complex word finding tasks in 4/5 opp w/ min cues over 3 sessions    *pt improves word finding in structured/unstructured tasks in /20 opp w/ minimal cues     6. Pt will complete convergent/divergent naming/thinking tasks in  3/5 opp w/ min cues over 3 sessions   *pt completes divergent naming w/ " basic concept categories w/ avg of 5 items w/ min-mod cues           D/w pt results and recommendations w/ verbal understanding and agreement. Plan for continued tx for /s/, SH, and s-blends.         Thank you for allowing me to participate in the care of your patient-  Harmony Garibay MA CCC-SLP            OP SLP Assessment/Plan - 07/21/21 1300        SLP Assessment    Functional Problems  Speech Language- Adult/Cognition   -KB    Impact on Function: Adult Speech Language/Cognition  Difficulty communicating wants, needs, and ideas   -KB    Clinical Impression: Speech Language-Adult/Congnition  Mild-Moderate:;Dysarthria- Mixed;Mild:;Expressive Aphasia   -KB    Please refer to paper survey for additional self-reported information  Yes   -KB    Please refer to items scanned into chart for additional diagnostic informaiton and handouts as provided by clinician  Yes   -KB    SLP Diagnosis  Mild-Moderate:;Dysarthria- Mixed;Mild:;Expressive Aphasia   -KB    Prognosis  Good (comment)   -KB    Patient/caregiver participated in establishment of treatment plan and goals  Yes   -KB    Patient would benefit from skilled therapy intervention  Yes   -KB       SLP Plan    Frequency  12 visits   -KB    Duration  12 weeks   -KB    Planned CPT's?  SLP INDIVIDUAL SPEECH THERAPY: 21811   -KB    Plan Comments  cont POC   -KB      User Key  (r) = Recorded By, (t) = Taken By, (c) = Cosigned By    Initials Name Provider Type    Risa Mata MA,CCC-SLP Speech and Language Pathologist              Risa Garibay MA,CCC-SLP  7/21/2021

## 2021-07-27 ENCOUNTER — TREATMENT (OUTPATIENT)
Dept: PHYSICAL THERAPY | Facility: CLINIC | Age: 46
End: 2021-07-27

## 2021-07-27 DIAGNOSIS — G81.91 RIGHT HEMIPARESIS (HCC): Primary | ICD-10-CM

## 2021-07-27 DIAGNOSIS — I63.119 CEREBRAL INFARCTION DUE TO EMBOLISM OF VERTEBRAL ARTERY, UNSPECIFIED BLOOD VESSEL LATERALITY (HCC): Primary | ICD-10-CM

## 2021-07-27 DIAGNOSIS — R26.81 UNSTEADY GAIT: ICD-10-CM

## 2021-07-27 DIAGNOSIS — G81.91 RIGHT HEMIPARESIS (HCC): ICD-10-CM

## 2021-07-27 PROCEDURE — 97116 GAIT TRAINING THERAPY: CPT | Performed by: PHYSICAL THERAPIST

## 2021-07-27 PROCEDURE — 97032 APPL MODALITY 1+ESTIM EA 15: CPT | Performed by: OCCUPATIONAL THERAPIST

## 2021-07-27 PROCEDURE — 97112 NEUROMUSCULAR REEDUCATION: CPT | Performed by: PHYSICAL THERAPIST

## 2021-07-27 PROCEDURE — 97112 NEUROMUSCULAR REEDUCATION: CPT | Performed by: OCCUPATIONAL THERAPIST

## 2021-07-27 PROCEDURE — 97110 THERAPEUTIC EXERCISES: CPT | Performed by: PHYSICAL THERAPIST

## 2021-07-27 NOTE — PROGRESS NOTES
OccupationalTherapy Daily Progress Note        Patient: Alexandro Gill   : 1975  Diagnosis/ICD-10 Code:  Right hemiparesis (CMS/HCC) [G81.91]  Referring practitioner: Rodrigo Dominguez, *  Date of Initial Visit: Type: THERAPY  Noted: 2021  Today's Date: 2021  Patient seen for 8 sessions         Alexandro Gill reports: patient states he is tired today        Subjective     Objective   See Exercise, Manual, and Modality Logs for complete treatment.       Assessment/Plan  NMES right wrist and hand for increasing grasp and release right hand.  Patient received facilitation, tapping and vibration right elbow and wrist.  AROM with arm skate for right shoulder and elbow ROM completed.  Grasp and release and  strengthening with right hand handgripper completed. Patient tolerated therapy well with increasing right shoulder ROM, elbow ROM and decreased tone noted today.    Progress per Plan of Care  Continue OT as planned for right UE           Timed Codes        Manual Therapy:         mins  47995;    Therapeutic Exercise:         mins  96254;     Neuromuscular Kiana:   25     mins  25359;    Therapeutic Activity:          mins  21592;      Ultrasound:          mins  01672;    Community/ work         mins  95707  Self Care/ Pat         mins  54555  Sensory Re-Int.                  mins   83007  Cognitve Re-train         mins  10086   OT estim attended        15   mins   07576  Un-timed Codes  Electrical Stimulation:         mins 9 82823 ( );      Timed Treatment:  40    mins   Total Treatment:     45   mins    Lluvia To OT  Occupational Therapist

## 2021-07-27 NOTE — PROGRESS NOTES
Physical Therapy Daily Treatment Note      Patient: Alexandro Gill   : 1975  Referring practitioner: Rodrigo Dominguez, *  Date of Initial Visit: Type: THERAPY  Noted: 6/10/2021  Today's Date: 2021  Patient seen for 10 sessions         Subjective:  Patient arrives to therapy today w/ his father.  Pt states he noticed his right foot was sore a few days ago, and his dad discovered he had an ingrown toe nail. Pt states his dad cut out the ingrown toe nail, and it seems to be doing better.  Pt reports of no signs of infection or area of concern.       Objective   See Exercise, Manual, and Modality Logs for complete treatment.       Assessment/Plan:  Supervising therapist, Denis Lozano, PT notified and aware of pt's subjective reports.  Pt's right foot/ toe was assessed, w/ no drainage or swelling observed.  PT and PTA educated pt to continue to monitor symptoms and seek medical attention if needed.  Pt verbalized understanding.  Today's session initiated w/ seated LE strengthening activities f/b TG squats and standing closed chain balance and gait activities.  Pt continues to require cues for improved weight shift onto right side.  Pt noted increased muscle fatigue w/ standing activities w/ rest breaks provided as needed.  Pt continues to benefit from therapy services and will be progressed as tolerated.  Continue w/ PT's POC.     Visit Diagnoses:    ICD-10-CM ICD-9-CM   1. 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 Unsteady gait  R26.81 781.2   3. Right hemiparesis (CMS/[facility])  G81.91 342.90       Progress per Plan of Care and Progress strengthening /stabilization /functional activity           Timed:  Manual Therapy:         mins  14642;  Therapeutic Exercise:    24     mins  02620;     Neuromuscular Kiana:    10    mins  90660;    Therapeutic Activity:          mins  99581;     Gait Training:     10      mins  87851;      Ultrasound:          mins  66550;    Electrical Stimulation:         mins  19632 ( );    Untimed:  Electrical Stimulation:         mins  74497 ( );  Mechanical Traction:         mins  71391;     Timed Treatment:   44   mins   Total Treatment:    44    mins  Jeannie Dover. ISIDRO Sandoval  Physical Therapist

## 2021-08-11 ENCOUNTER — TREATMENT (OUTPATIENT)
Dept: PHYSICAL THERAPY | Facility: CLINIC | Age: 46
End: 2021-08-11

## 2021-08-11 DIAGNOSIS — R47.1 DYSARTHRIA: ICD-10-CM

## 2021-08-11 DIAGNOSIS — I63.119 CEREBRAL INFARCTION DUE TO EMBOLISM OF VERTEBRAL ARTERY, UNSPECIFIED BLOOD VESSEL LATERALITY (HCC): ICD-10-CM

## 2021-08-11 DIAGNOSIS — I63.119 CEREBRAL INFARCTION DUE TO EMBOLISM OF VERTEBRAL ARTERY, UNSPECIFIED BLOOD VESSEL LATERALITY (HCC): Primary | ICD-10-CM

## 2021-08-11 DIAGNOSIS — R26.81 UNSTEADY GAIT: ICD-10-CM

## 2021-08-11 DIAGNOSIS — G81.91 RIGHT HEMIPARESIS (HCC): ICD-10-CM

## 2021-08-11 DIAGNOSIS — G81.91 RIGHT HEMIPARESIS (HCC): Primary | ICD-10-CM

## 2021-08-11 PROCEDURE — 97116 GAIT TRAINING THERAPY: CPT | Performed by: PHYSICAL THERAPIST

## 2021-08-11 PROCEDURE — 97112 NEUROMUSCULAR REEDUCATION: CPT | Performed by: PHYSICAL THERAPIST

## 2021-08-11 PROCEDURE — 97110 THERAPEUTIC EXERCISES: CPT | Performed by: PHYSICAL THERAPIST

## 2021-08-11 PROCEDURE — 97032 APPL MODALITY 1+ESTIM EA 15: CPT | Performed by: OCCUPATIONAL THERAPIST

## 2021-08-11 PROCEDURE — 92507 TX SP LANG VOICE COMM INDIV: CPT | Performed by: SPEECH-LANGUAGE PATHOLOGIST

## 2021-08-11 PROCEDURE — 97112 NEUROMUSCULAR REEDUCATION: CPT | Performed by: OCCUPATIONAL THERAPIST

## 2021-08-11 NOTE — PROGRESS NOTES
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 provider found  Date of Initial Visit: Type: THERAPY  Noted: 2021  Today's Date: 2021  Patient seen for 9 sessions      Subjective:   Alexandro Gill reports: patient states he is using right arm more  He wants to continue with therapy for next 2-4 weeks with hopes of admission [facility] as well as patient spouse expecting baby in about a month.    Objective          Active Range of Motion     Additional Active Range of Motion Details  1/2 range right shoulder  3/4 range right elbow  3/4 range right wrist  3/4 grasp and 1/4 extension right hand    Strength/Myotome Testing     Right Wrist/Hand      (2nd hand position)     Trial 1: 37 lbs    Tests     Additional Tests Details  Unable to complete gmc,fmc testing with right hand     General Comments     Wrist/Hand Comments  Patient reports using right hand and UE more at home with acitivity as well as noticing decreased tone in right hand and arm over last couple weeks     Assessment & Plan     Assessment  Impairments: abnormal coordination, abnormal or restricted ROM and impaired physical strength  Assessment details: Patient has increased right ROM and strength.  He also has demonstrated increased functional use of right UE at home .  Decreased tone noted as well right UE.  Patient states he wants to continue therapy for next 2-4 weeks.  Prognosis: good    Goals  Plan Goals: Goals  Plan Goals: STG  1.  Patient will increase right  3-5 lbs  met  2.  Patient will improve right shoulder ROM to 1/2 range.partially  3.  Patient will increase right hand ROM to grasp and release large objects consistently.  4  Patient will be independent with ROM HEP    LTG  progressing  1.  Patient will be independent  with HEP as appropriate.  2. Patient will increase right functional use, strength and coordination to use as active assist with self care/ADL    Plan  Therapy options: will be seen for skilled physical therapy services  Planned therapy interventions: motor coordination training, neuromuscular re-education, strengthening, stretching, therapeutic activities, fine motor coordination training, ADL retraining, functional ROM exercises and home exercise program  Frequency: 2x week  Duration in visits: 8  Treatment plan discussed with: patient  Plan details: OT as planned for right UE          OT Signature: Lluvia To OT      Signature: __________________________________        Timed Codes        Manual Therapy:         mins  95429;    Therapeutic Exercise:         mins  10141;     Neuromuscular Kiana:   30     mins  53579;    Therapeutic Activity:          mins  91001;      Ultrasound:          mins  00617;    Community/ work         mins  03248  Self Care/ Pat         mins  33671  Sensory Re-Int.                  mins   68952  Cognitve Re-train         mins  64185   OT estim attended       15   mins   86363  Un-timed Codes  Electrical Stimulation:         mins 9 76091 ( );    Timed Treatment:    45  mins   Total Treatment:   50     mins

## 2021-08-11 NOTE — PROGRESS NOTES
Progress Note    Patient: Alexandro Gill   : 1975  Diagnosis/ICD-10 Code:  Right hemiparesis (CMS/HCC) [G81.91]  Referring practitioner: No ref. provider found  Date of Initial Visit: Type: THERAPY  Noted: 6/10/2021  Today's Date: 2021  Patient seen for 11 sessions    Visit Diagnoses:    ICD-10-CM ICD-9-CM   1. Right hemiparesis (CMS/HCC)  G81.91 342.90   2. 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 Unsteady gait  R26.81 781.2       Subjective:   Alexandro Gill reports:   Subjective Questionnaire: DICKINSON 41; TUG 27.5  Clinical Progress: improved  Home Program Compliance: Yes  Treatment has included: therapeutic exercise, neuromuscular re-education, manual therapy, therapeutic activity, gait training, electrical stimulation, moist heat and cryotherapy    Subjective Evaluation    History of Present Illness    Subjective comment: Pt received an AFO and a right knee brace last week.       Objective   Assessment & Plan     Assessment  Impairments: abnormal coordination, abnormal gait, abnormal muscle firing, abnormal muscle tone, abnormal or restricted ROM, activity intolerance, impaired balance, impaired physical strength, lacks appropriate home exercise program, pain with function and safety issue  Assessment details: Pt is a 47 y/o male referred to therapy following deficits from a CVA.  Pt has attended 11 sessions with noted gains of 4 points on the DICKINSON and 6 seconds on his TUG. Pt has demonstrated good gains with transfers, gait and balance.  Pt has requested to cont for two weeks and discharge with HEP.  Prognosis: good  Functional Limitations: carrying objects, lifting, walking, pulling, moving in bed, standing, reaching overhead and unable to perform repetitive tasks  Goals  Plan Goals: STG 6 weeks    1 Pt will be instructed troy HEP.met  2 Pt will be fitted for bracing to prevent foot drop and knee hyperextension.met  3  Pt will improve his TUG to less than 45 seconds.met  4 Pt will improve his DICKINSON to more than 35/56.met    LTG 12 weeks    1 Pt will improve his TUG to less than 35 seconds.met  2 Pt will improve his DICKINSON to 40 or greater.met  3 Pt will demonstrate improved right quad stability with gait with noted improved stance and hip flexion on right.progressing    Plan  Therapy options: will be seen for skilled physical therapy services  Planned modality interventions: cryotherapy, electrical stimulation/Russian stimulation, thermotherapy (hydrocollator packs) and ultrasound  Planned therapy interventions: abdominal trunk stabilization, ADL retraining, body mechanics training, flexibility, functional ROM exercises, gait training, home exercise program, IADL retraining, joint mobilization, manual therapy, neuromuscular re-education, postural training, soft tissue mobilization, strengthening, stretching, therapeutic activities and orthotic fitting/training  Duration in visits: 8  Duration in weeks: 4  Treatment plan discussed with: patient  Plan details: Will follow for optimal gains.  Moderate Evaluation  54509  Re-evaluation   97631  Therapeutic exercise  16389  Therapeutic activity    52365  Neuromuscular re-education   75712  Manual therapy   93394  Gait training  69586  Unattended e-stim (Medicaid/Medicare)     Moist heat/cryotherapy 77261   Ultrasound   18454          Visit Diagnoses:    ICD-10-CM ICD-9-CM   1. Right hemiparesis (CMS/[facility])  G81.91 342.90   2. 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 Unsteady gait  R26.81 781.2       Progress toward previous goals: Partially Met    New Goals  Short-term goals (STG): 4/4  Long-term goals (LTG): 2/3      Recommendations: Continue as planned  Timeframe: 2 weeks  Prognosis to achieve goals: good    PT Signature: Denis Lozano, PT      Based upon review of the patient's progress and  continued therapy plan, it is my medical opinion that Alexandro Gill should continue physical therapy treatment at [facility]  [address]  [address]  [place] [address] [phone].    Signature: __________________________________      Timed:  Manual Therapy:         mins  48852;  Therapeutic Exercise:    17     mins  64187;     Neuromuscular Kiana:    14    mins  72326;    Therapeutic Activity:          mins  96940;     Gait Training:      10     mins  96769;     Ultrasound:          mins  11312;    Electrical Stimulation:         mins  62364 ( );    Untimed:  Electrical Stimulation:         mins  03585 ( );  Mechanical Traction:         mins  35515;     Timed Treatment:   41   mins   Total Treatment:     41   mins

## 2021-08-11 NOTE — PROGRESS NOTES
"Outpatient Speech Language Pathology   Adult Speech Language Cognitive Treatment Note       Patient Name: Alexandro Gill  : 1975  MRN: [id number]  Today's Date: 2021         Visit Date: 2021   There is no problem list on file for this patient.         Visit Dx:    ICD-10-CM ICD-9-CM   1. 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 Dysarthria  R47.1 784.51        Pt arrives for session; in lobby waiting for session. Pt wears mask.            Long Term Goal:  1. Patient will improve expressive language skills to allow for maximal functional communication and independence in adls.              Short Term Goals:  1. Pt will tolerate facial massage to facial surface for 3-7 minutes to increase surface blood flow, sensation and ROM over 3 consecutive sessions.   *not directly addressed      2. Pt will improve word finding in conversation 4/5 opp w/ min cues over 3 sessions.   *pt improves word finding in structured/unstructured tasks in  opp w/ minimal cues GOAL MET     3. Pt will name objects/verbs per verbal description w/ 80% acc given min cues over 3 sessions.   *pt names objects per verbal description in 10/10 opp w/ min cues GOAL MET     4. Pt will decrease presence of dysarthria at conversation level in 4/5 opp w/ min cues over 3 sessions.  *pt demonstrates mild difficulty w/ \"sh\", /s/, \"j\" and s-blend productions at sentence/conversation levels; use of loaded sentences, tongue twisters, multiple syllabic words     5. Pt will complete complex word finding tasks in 4/5 opp w/ min cues over 3 sessions    *pt improves word finding in structured/unstructured tasks in  opp w/ minimal cues GOAL MET     6. Pt will complete convergent/divergent naming/thinking tasks in  3/5 opp w/ min cues over 3 sessions   *pt completes divergent naming w/ basic concept categories w/ avg of 6 items w/ min-mod cues           D/w pt " results and recommendations w/ verbal understanding and agreement. Plan for continued tx for /s/, SH, and s-blends.  Plan for x2 more sessions.         Thank you for allowing me to participate in the care of your patient-  Harmony Garibay MA CCC-SLP               Risa Garibay MA,CCC-SLP  8/11/2021

## 2021-08-18 ENCOUNTER — TREATMENT (OUTPATIENT)
Dept: PHYSICAL THERAPY | Facility: CLINIC | Age: 46
End: 2021-08-18

## 2021-08-18 DIAGNOSIS — I63.119 CEREBRAL INFARCTION DUE TO EMBOLISM OF VERTEBRAL ARTERY, UNSPECIFIED BLOOD VESSEL LATERALITY (HCC): ICD-10-CM

## 2021-08-18 DIAGNOSIS — G81.91 RIGHT HEMIPARESIS (HCC): Primary | ICD-10-CM

## 2021-08-18 DIAGNOSIS — R26.81 UNSTEADY GAIT: ICD-10-CM

## 2021-08-18 PROCEDURE — 97032 APPL MODALITY 1+ESTIM EA 15: CPT | Performed by: OCCUPATIONAL THERAPIST

## 2021-08-18 PROCEDURE — 97530 THERAPEUTIC ACTIVITIES: CPT | Performed by: PHYSICAL THERAPIST

## 2021-08-18 PROCEDURE — 97110 THERAPEUTIC EXERCISES: CPT | Performed by: PHYSICAL THERAPIST

## 2021-08-18 PROCEDURE — 97112 NEUROMUSCULAR REEDUCATION: CPT | Performed by: OCCUPATIONAL THERAPIST

## 2021-08-18 NOTE — PROGRESS NOTES
Patient: Alexandro Gill   : 1975  Referring practitioner: Rodrigo Dominguez, *  Date of Initial Visit: Type: THERAPY  Noted: 6/10/2021  Today's Date: 2021  Patient seen for 12 sessions           Subjective Evaluation    History of Present Illness    Subjective comment: Pt reports suffering from a fall Saturday with no injuries. (Patient evaluated with no new impairments noted.)       Objective   See Exercise, Manual, and Modality Logs for complete treatment.       Assessment & Plan     Assessment  Assessment details: Tx today consisted of there ex for improved leg stability, balance and pre gait; followed by power chair evaluation and review with [facility].  Patient responded well to tx with good effort.    Plan  Plan details: Will follow 1-2 sessions for improved core stability and standing balance in order to reduce falls at home.        Visit Diagnoses:    ICD-10-CM ICD-9-CM   1. Right hemiparesis (CMS/[facility])  G81.91 342.90   2. 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)  I63.119 433.21   3. Unsteady gait  R26.81 781.2       Progress strengthening /stabilization /functional activity           Timed:  Manual Therapy:         mins  89376;  Therapeutic Exercise:    18     mins  15246;     Neuromuscular Kiana:        mins  32414;    Therapeutic Activity:     14     mins  05469;     Gait Training:           mins  56437;     Ultrasound:          mins  69519;    Electrical Stimulation:         mins  66558 ( );    Untimed:  Electrical Stimulation:         mins  05340 ( );  Mechanical Traction:         mins  82222;     Timed Treatment:   32   mins   Total Treatment:     32   mins  Denis Lozano, PT  Physical Therapist

## 2021-08-18 NOTE — PROGRESS NOTES
OccupationalTherapy Daily Progress Note        Patient: Alexandro Gill   : 1975  Diagnosis/ICD-10 Code:  Right hemiparesis (CMS/[facility]) [G81.91]  Referring practitioner: Rodrigo Dominguez, *  Date of Initial Visit: Type: THERAPY  Noted: 2021  Today's Date: 2021  Patient seen for 10 sessions         Alexandro Gill reports: patient has no new complaints today      Subjective     Objective   See Exercise, Manual, and Modality Logs for complete treatment.       Assessment/Plan  Patient continues to receive NMES right wrist and hand to increase functional grasp and right .  PROM and stretch completed right UE with vibration and tapping right elbow and wrist.  Patient completed AROM with arm skate right UE.  hangripper right hand grasp and extension completed.  Patient tolerated therapy well, patient continues to improve with left ROM.    Progress per Plan of Care  Continue OT as planned for right UE           Timed Codes        Manual Therapy:         mins  70375;    Therapeutic Exercise:         mins  56726;     Neuromuscular Kiana: 30       mins  81607;    Therapeutic Activity:          mins  48510;      Ultrasound:          mins  48082;    Community/ work         mins  16527  Self Care/ Pat         mins  48652  Sensory Re-Int.                  mins   33121  Cognitve Re-train         mins  12883   OT estim attended        15   mins   32038  Un-timed Codes  Electrical Stimulation:         mins 9 65259 ( );      Timed Treatment:   45   mins   Total Treatment:    50    mins    Lluvia To OT  Occupational Therapist

## 2021-08-31 ENCOUNTER — TRANSCRIBE ORDERS (OUTPATIENT)
Dept: ADMINISTRATIVE | Facility: HOSPITAL | Age: 46
End: 2021-08-31

## 2021-08-31 DIAGNOSIS — S06.9X9A CLOSED FRACTURE OF VAULT OF SKULL, LOSS OF CONSCIOUSNESS (HCC): Primary | ICD-10-CM

## 2021-08-31 DIAGNOSIS — S02.0XXA CLOSED FRACTURE OF VAULT OF SKULL, LOSS OF CONSCIOUSNESS (HCC): Primary | ICD-10-CM

## 2021-09-01 ENCOUNTER — TREATMENT (OUTPATIENT)
Dept: PHYSICAL THERAPY | Facility: CLINIC | Age: 46
End: 2021-09-01

## 2021-09-01 ENCOUNTER — HOSPITAL ENCOUNTER (OUTPATIENT)
Dept: CT IMAGING | Facility: HOSPITAL | Age: 46
Discharge: HOME | End: 2021-09-01
Admitting: PSYCHIATRY & NEUROLOGY

## 2021-09-01 DIAGNOSIS — S06.9X9A CLOSED FRACTURE OF VAULT OF SKULL, LOSS OF CONSCIOUSNESS (HCC): ICD-10-CM

## 2021-09-01 DIAGNOSIS — S02.0XXA CLOSED FRACTURE OF VAULT OF SKULL, LOSS OF CONSCIOUSNESS (HCC): ICD-10-CM

## 2021-09-01 DIAGNOSIS — R26.81 UNSTEADY GAIT: Primary | ICD-10-CM

## 2021-09-01 DIAGNOSIS — I63.119 CEREBRAL INFARCTION DUE TO EMBOLISM OF VERTEBRAL ARTERY, UNSPECIFIED BLOOD VESSEL LATERALITY (HCC): ICD-10-CM

## 2021-09-01 DIAGNOSIS — R47.1 DYSARTHRIA: ICD-10-CM

## 2021-09-01 DIAGNOSIS — G81.91 RIGHT HEMIPARESIS (HCC): Primary | ICD-10-CM

## 2021-09-01 DIAGNOSIS — I63.119 CEREBRAL INFARCTION DUE TO EMBOLISM OF VERTEBRAL ARTERY, UNSPECIFIED BLOOD VESSEL LATERALITY (HCC): Primary | ICD-10-CM

## 2021-09-01 DIAGNOSIS — G81.91 RIGHT HEMIPARESIS (HCC): ICD-10-CM

## 2021-09-01 PROCEDURE — 97112 NEUROMUSCULAR REEDUCATION: CPT | Performed by: OCCUPATIONAL THERAPIST

## 2021-09-01 PROCEDURE — 97112 NEUROMUSCULAR REEDUCATION: CPT | Performed by: PHYSICAL THERAPIST

## 2021-09-01 PROCEDURE — 70450 CT HEAD/BRAIN W/O DYE: CPT | Performed by: RADIOLOGY

## 2021-09-01 PROCEDURE — 70450 CT HEAD/BRAIN W/O DYE: CPT

## 2021-09-01 PROCEDURE — 97110 THERAPEUTIC EXERCISES: CPT | Performed by: OCCUPATIONAL THERAPIST

## 2021-09-01 PROCEDURE — 97110 THERAPEUTIC EXERCISES: CPT | Performed by: PHYSICAL THERAPIST

## 2021-09-01 PROCEDURE — 97014 ELECTRIC STIMULATION THERAPY: CPT | Performed by: OCCUPATIONAL THERAPIST

## 2021-09-01 PROCEDURE — 97116 GAIT TRAINING THERAPY: CPT | Performed by: PHYSICAL THERAPIST

## 2021-09-01 PROCEDURE — 92507 TX SP LANG VOICE COMM INDIV: CPT | Performed by: SPEECH-LANGUAGE PATHOLOGIST

## 2021-09-01 NOTE — PROGRESS NOTES
"Outpatient Speech Language Pathology   Adult Speech Language Cognitive Treatment Note       Patient Name: Alexandro Gill  : 1975  MRN: [id number]  Today's Date: 2021         Visit Date: 2021   There is no problem list on file for this patient.         Visit Dx:    ICD-10-CM ICD-9-CM   1. 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 Dysarthria  R47.1 784.51           Pt arrives for session;transitions via wheelchair w/ assistance. Pt wears mask.             Long Term Goal:  1. Patient will improve expressive language skills to allow for maximal functional communication and independence in adls.              Short Term Goals:  1. Pt will tolerate facial massage to facial surface for 3-7 minutes to increase surface blood flow, sensation and ROM over 3 consecutive sessions.   *not directly addressed      2. Pt will improve word finding in conversation 4/5 opp w/ min cues over 3 sessions.   *pt improves word finding in structured/unstructured tasks in  opp w/ minimal cues GOAL MET     3. Pt will name objects/verbs per verbal description w/ 80% acc given min cues over 3 sessions.   *pt names objects per verbal description in 10/10 opp w/ min cues GOAL MET     4. Pt will decrease presence of dysarthria at conversation level in 4/5 opp w/ min cues over 3 sessions.  *pt demonstrates mild difficulty w/ \"sh\", /s/, 'CH\" and s-blend productions at sentence/conversation levels; use of loaded sentences, tongue twisters, multiple syllabic words; approx 80% acc w/ speech productions      5. Pt will complete complex word finding tasks in 4/5 opp w/ min cues over 3 sessions    *pt improves word finding in structured/unstructured tasks in /20 opp w/ minimal cues GOAL MET     6. Pt will complete convergent/divergent naming/thinking tasks in  3/5 opp w/ min cues over 3 sessions   *pt completes divergent naming w/ basic concept " categories w/ avg of 7 items w/ min cues           D/w pt results and recommendations w/ verbal understanding and agreement. Plan for continued tx for /s/, SH, and s-blends.  Discharged this session s/t goals met and progress made in sessions. Pt reports verbal agreement and understanding.         Thank you for allowing me to participate in the care of your patient-  Harmony Garibay MA CCC-SLP                          Risa Garibay MA,CCC-SLP  9/1/2021

## 2021-09-01 NOTE — PROGRESS NOTES
Physical Therapy Daily Treatment Note      Patient: Alexandro Gill   : 1975  Referring practitioner: Rodrigo Dominguez, *  Date of Initial Visit: Type: THERAPY  Noted: 6/10/2021  Today's Date: 2021  Patient seen for 13 sessions         Subjective:  Patient arrives to therapy today w/ his father.  Pt states he wishes for today to be his last appointment due to upcoming arrival of baby.  Pt states he plans to continue w/ his exercises at home, and return as needed.      Objective   See Exercise, Manual, and Modality Logs for complete treatment.       Assessment/Plan:  Supervising therapist, Denis Lozano, PT notified and aware that patient wishes to discharge today.  PT agreeable and assisted w/ obtainment of objective measurements.  Pt displayed improved TUG score from last progress note, and will continue to benefit from home program.  Pt performed therex as listed f/b neuro re-ed for improved balance, and gait training w/ HW.  Pt continues to require cues while completing exercise for improved weight shift onto involved side.   Discharge will be completed by Denis Lozano PT.  Pt educated to f/u with MD as needed w/ pt verbalizing understanding.      Visit Diagnoses:    ICD-10-CM ICD-9-CM   1. Unsteady gait  R26.81 781.2   2. Right hemiparesis (CMS/[facility])  G81.91 342.90   3. 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, unspecified blood vessel laterality (CMS/[facility])  I63.119 433.21       Progress per Plan of Care and Progress strengthening /stabilization /functional activity           Timed:  Manual Therapy:         mins  10291;  Therapeutic Exercise:     16    mins  91065;     Neuromuscular Kiana:   21     mins  38603;    Therapeutic Activity:          mins  39774;     Gait Trainin      mins  69792;     Ultrasound:          mins  27677;    Electrical Stimulation:         mins  77826 ( );    Untimed:  Electrical Stimulation:         mins  82418 ( );  Mechanical  Traction:         mins  43692;     Timed Treatment:  46    mins   Total Treatment:     46    mins  Jeannie Dover. ISIDRO Sandoval  Physical Therapist

## 2021-09-01 NOTE — PROGRESS NOTES
Outpatient Occupational Therapy Discharge Summary         Patient Name: Alexandro Gill  : 1975  MRN: [id number]    Today's Date: 2021      Visit Date: 2021        Alexandro seen this session for OT treatment and d/c. OT provided patient education reinforcing HEP. Alexandro engaged in NMR techniques to right UE to decrease muscle tone and improve positioning and muscle facilitation for increased ADL independence and skin protection. Also, OT provided Alexandro with right UE PROM in all major planes in addition to AAROM. Alexandro pleasant and motivated; Alexandro has achieved goals at this time and is requesting OT d/c vs possible goal upgrades or revisions..     Modified Regino Scale: right elbow flexor= 3, right elbow extensors=3  Right Hand  strength= 13 lbs          OT Goals     Row Name 21 1500          OT Short Term Goals    STG 1  Patient will increase right  3-5 lbs  -KM     STG 1 Progress  Met  -KM     STG 1 Progress Comments  13 lbs  -KM     STG 2  Patient will improve right shoulder ROM to 1/2 range.partially  -KM     STG 2 Progress  Met  -KM     STG 3  Patient will increase right hand ROM to grasp and release large objects consistently  -KM     STG 3 Progress  Met  -KM     STG 3 Progress Comments  general composite AROM flexion WFLs, general AROM extension impaired  -KM     STG 4  Patient will be independent with ROM HEP  -KM     STG 4 Progress  Met  -KM        Long Term Goals    LTG 1  Patient will be independent with HEP as appropriate.  -KM     LTG 1 Progress  Met  -KM     LTG 2  Patient will increase right functional use, strength and coordination to use as active assist with self care/ADL  -KM     LTG 2 Progress  Met  -KM       User Key  (r) = Recorded By, (t) = Taken By, (c) = Cosigned By    Initials Name Provider Type    KM Maynes, Kenny D, OT Occupational Therapist           OP OT Discharge Summary  Date of Discharge: 21  Reason for Discharge: All goals achieved,  Maximum functional potential achieved, Patient/Caregiver request  Outcomes Achieved: Able to achieve all goals within established timeline      Time Calculation:   Timed Charges  32917 - OT Therapeutic Exercise Minutes: 16  21330 -  OT Neuromuscular Reeducation Minutes: 19  Untimed Charges  OT E-Stim Unattended Minutes: 10  Total Minutes  Timed Charges Total Minutes: 35  Untimed Charges Total Minutes: 10   Total Minutes: 45                     Kenny D Maynes, OT   9/1/2021

## 2021-12-06 ENCOUNTER — APPOINTMENT (OUTPATIENT)
Dept: GENERAL RADIOLOGY | Facility: HOSPITAL | Age: 46
End: 2021-12-06

## 2021-12-06 ENCOUNTER — HOSPITAL ENCOUNTER (EMERGENCY)
Facility: HOSPITAL | Age: 46
Discharge: ANOTHER HOSPITAL | End: 2021-12-07
Attending: EMERGENCY MEDICINE | Admitting: EMERGENCY MEDICINE

## 2021-12-06 DIAGNOSIS — J96.01 ACUTE RESPIRATORY FAILURE WITH HYPOXIA (HCC): ICD-10-CM

## 2021-12-06 DIAGNOSIS — J44.1 COPD WITH ACUTE EXACERBATION (HCC): Primary | ICD-10-CM

## 2021-12-06 LAB
A-A DO2: 45.2 MMHG (ref 0–300)
APTT PPP: 30.7 SECONDS (ref 25.5–35.4)
ARTERIAL PATENCY WRIST A: ABNORMAL
ATMOSPHERIC PRESS: 732 MMHG
BASE EXCESS BLDA CALC-SCNC: 3 MMOL/L (ref 0–2)
BASOPHILS # BLD AUTO: 0.1 10*3/MM3 (ref 0–0.2)
BASOPHILS NFR BLD AUTO: 0.7 % (ref 0–1.5)
BDY SITE: ABNORMAL
BODY TEMPERATURE: 0 C
CO2 BLDA-SCNC: 28.2 MMOL/L (ref 22–33)
COHGB MFR BLD: 1.1 % (ref 0–5)
CRP SERPL-MCNC: 1.8 MG/DL (ref 0–0.5)
D-LACTATE SERPL-SCNC: 0.5 MMOL/L (ref 0.5–2)
DEPRECATED RDW RBC AUTO: 42.6 FL (ref 37–54)
EOSINOPHIL # BLD AUTO: 0.58 10*3/MM3 (ref 0–0.4)
EOSINOPHIL NFR BLD AUTO: 4.1 % (ref 0.3–6.2)
ERYTHROCYTE [DISTWIDTH] IN BLOOD BY AUTOMATED COUNT: 13.3 % (ref 12.3–15.4)
FLUAV RNA RESP QL NAA+PROBE: NOT DETECTED
FLUBV RNA RESP QL NAA+PROBE: NOT DETECTED
HCO3 BLDA-SCNC: 27 MMOL/L (ref 20–26)
HCT VFR BLD AUTO: 47.1 % (ref 37.5–51)
HCT VFR BLD CALC: 49.6 % (ref 38–51)
HGB BLD-MCNC: 15.7 G/DL (ref 13–17.7)
HGB BLDA-MCNC: 16.2 G/DL (ref 14–18)
IMM GRANULOCYTES # BLD AUTO: 0.03 10*3/MM3 (ref 0–0.05)
IMM GRANULOCYTES NFR BLD AUTO: 0.2 % (ref 0–0.5)
INHALED O2 CONCENTRATION: 21 %
INR PPP: 0.89 (ref 0.9–1.1)
LYMPHOCYTES # BLD AUTO: 2.78 10*3/MM3 (ref 0.7–3.1)
LYMPHOCYTES NFR BLD AUTO: 19.8 % (ref 19.6–45.3)
Lab: ABNORMAL
Lab: ABNORMAL
MAGNESIUM SERPL-MCNC: 2.2 MG/DL (ref 1.6–2.6)
MCH RBC QN AUTO: 29 PG (ref 26.6–33)
MCHC RBC AUTO-ENTMCNC: 33.3 G/DL (ref 31.5–35.7)
MCV RBC AUTO: 87.1 FL (ref 79–97)
METHGB BLD QL: 0.2 % (ref 0–3)
MODALITY: ABNORMAL
MONOCYTES # BLD AUTO: 1.17 10*3/MM3 (ref 0.1–0.9)
MONOCYTES NFR BLD AUTO: 8.3 % (ref 5–12)
NEUTROPHILS NFR BLD AUTO: 66.9 % (ref 42.7–76)
NEUTROPHILS NFR BLD AUTO: 9.39 10*3/MM3 (ref 1.7–7)
NOTE: ABNORMAL
NOTIFIED BY: ABNORMAL
NOTIFIED WHO: ABNORMAL
NRBC BLD AUTO-RTO: 0 /100 WBC (ref 0–0.2)
NT-PROBNP SERPL-MCNC: 147.1 PG/ML (ref 0–450)
OXYHGB MFR BLDV: 89.4 % (ref 94–99)
PCO2 BLDA: 38.7 MM HG (ref 35–45)
PCO2 TEMP ADJ BLD: ABNORMAL MM[HG]
PH BLDA: 7.45 PH UNITS (ref 7.35–7.45)
PH, TEMP CORRECTED: ABNORMAL
PLATELET # BLD AUTO: 255 10*3/MM3 (ref 140–450)
PMV BLD AUTO: 9 FL (ref 6–12)
PO2 BLDA: 54.9 MM HG (ref 83–108)
PO2 TEMP ADJ BLD: ABNORMAL MM[HG]
PROTHROMBIN TIME: 12.5 SECONDS (ref 12.8–14.5)
RBC # BLD AUTO: 5.41 10*6/MM3 (ref 4.14–5.8)
SAO2 % BLDCOA: 90.6 % (ref 94–99)
SARS-COV-2 RNA RESP QL NAA+PROBE: NOT DETECTED
TROPONIN T SERPL-MCNC: <0.01 NG/ML (ref 0–0.03)
VENTILATOR MODE: ABNORMAL
WBC NRBC COR # BLD: 14.05 10*3/MM3 (ref 3.4–10.8)

## 2021-12-06 PROCEDURE — 85730 THROMBOPLASTIN TIME PARTIAL: CPT | Performed by: EMERGENCY MEDICINE

## 2021-12-06 PROCEDURE — 93005 ELECTROCARDIOGRAM TRACING: CPT | Performed by: EMERGENCY MEDICINE

## 2021-12-06 PROCEDURE — 82375 ASSAY CARBOXYHB QUANT: CPT

## 2021-12-06 PROCEDURE — 80053 COMPREHEN METABOLIC PANEL: CPT | Performed by: EMERGENCY MEDICINE

## 2021-12-06 PROCEDURE — 85610 PROTHROMBIN TIME: CPT | Performed by: EMERGENCY MEDICINE

## 2021-12-06 PROCEDURE — 93010 ELECTROCARDIOGRAM REPORT: CPT | Performed by: INTERNAL MEDICINE

## 2021-12-06 PROCEDURE — 36600 WITHDRAWAL OF ARTERIAL BLOOD: CPT

## 2021-12-06 PROCEDURE — 87040 BLOOD CULTURE FOR BACTERIA: CPT | Performed by: EMERGENCY MEDICINE

## 2021-12-06 PROCEDURE — 85379 FIBRIN DEGRADATION QUANT: CPT | Performed by: EMERGENCY MEDICINE

## 2021-12-06 PROCEDURE — 83050 HGB METHEMOGLOBIN QUAN: CPT

## 2021-12-06 PROCEDURE — 71045 X-RAY EXAM CHEST 1 VIEW: CPT

## 2021-12-06 PROCEDURE — 94799 UNLISTED PULMONARY SVC/PX: CPT

## 2021-12-06 PROCEDURE — 86140 C-REACTIVE PROTEIN: CPT | Performed by: EMERGENCY MEDICINE

## 2021-12-06 PROCEDURE — 94640 AIRWAY INHALATION TREATMENT: CPT

## 2021-12-06 PROCEDURE — 84484 ASSAY OF TROPONIN QUANT: CPT | Performed by: EMERGENCY MEDICINE

## 2021-12-06 PROCEDURE — 82805 BLOOD GASES W/O2 SATURATION: CPT

## 2021-12-06 PROCEDURE — 83880 ASSAY OF NATRIURETIC PEPTIDE: CPT | Performed by: EMERGENCY MEDICINE

## 2021-12-06 PROCEDURE — 83735 ASSAY OF MAGNESIUM: CPT | Performed by: EMERGENCY MEDICINE

## 2021-12-06 PROCEDURE — 36415 COLL VENOUS BLD VENIPUNCTURE: CPT

## 2021-12-06 PROCEDURE — 83605 ASSAY OF LACTIC ACID: CPT | Performed by: EMERGENCY MEDICINE

## 2021-12-06 PROCEDURE — 99284 EMERGENCY DEPT VISIT MOD MDM: CPT

## 2021-12-06 PROCEDURE — 96365 THER/PROPH/DIAG IV INF INIT: CPT

## 2021-12-06 PROCEDURE — 85025 COMPLETE CBC W/AUTO DIFF WBC: CPT | Performed by: EMERGENCY MEDICINE

## 2021-12-06 PROCEDURE — 87636 SARSCOV2 & INF A&B AMP PRB: CPT | Performed by: EMERGENCY MEDICINE

## 2021-12-06 RX ORDER — METHYLPREDNISOLONE SODIUM SUCCINATE 40 MG/ML
80 INJECTION, POWDER, LYOPHILIZED, FOR SOLUTION INTRAMUSCULAR; INTRAVENOUS ONCE
Status: COMPLETED | OUTPATIENT
Start: 2021-12-07 | End: 2021-12-07

## 2021-12-06 RX ORDER — SODIUM CHLORIDE 0.9 % (FLUSH) 0.9 %
10 SYRINGE (ML) INJECTION AS NEEDED
Status: DISCONTINUED | OUTPATIENT
Start: 2021-12-06 | End: 2021-12-07 | Stop reason: HOSPADM

## 2021-12-06 RX ORDER — IPRATROPIUM BROMIDE AND ALBUTEROL SULFATE 2.5; .5 MG/3ML; MG/3ML
3 SOLUTION RESPIRATORY (INHALATION) ONCE
Status: COMPLETED | OUTPATIENT
Start: 2021-12-06 | End: 2021-12-06

## 2021-12-06 RX ORDER — AZITHROMYCIN 250 MG/1
500 TABLET, FILM COATED ORAL ONCE
Status: COMPLETED | OUTPATIENT
Start: 2021-12-07 | End: 2021-12-07

## 2021-12-06 RX ADMIN — IPRATROPIUM BROMIDE AND ALBUTEROL SULFATE 3 ML: .5; 3 SOLUTION RESPIRATORY (INHALATION) at 23:58

## 2021-12-07 VITALS
SYSTOLIC BLOOD PRESSURE: 129 MMHG | OXYGEN SATURATION: 95 % | HEIGHT: 65 IN | TEMPERATURE: 99.8 F | BODY MASS INDEX: 26.66 KG/M2 | DIASTOLIC BLOOD PRESSURE: 97 MMHG | WEIGHT: 160 LBS | RESPIRATION RATE: 18 BRPM | HEART RATE: 74 BPM

## 2021-12-07 LAB
ALBUMIN SERPL-MCNC: 4.37 G/DL (ref 3.5–5.2)
ALBUMIN/GLOB SERPL: 1.5 G/DL
ALP SERPL-CCNC: 80 U/L (ref 39–117)
ALT SERPL W P-5'-P-CCNC: 15 U/L (ref 1–41)
ANION GAP SERPL CALCULATED.3IONS-SCNC: 14.6 MMOL/L (ref 5–15)
AST SERPL-CCNC: 17 U/L (ref 1–40)
BILIRUB SERPL-MCNC: 0.3 MG/DL (ref 0–1.2)
BUN SERPL-MCNC: 8 MG/DL (ref 6–20)
BUN/CREAT SERPL: 6.4 (ref 7–25)
CALCIUM SPEC-SCNC: 9.3 MG/DL (ref 8.6–10.5)
CHLORIDE SERPL-SCNC: 105 MMOL/L (ref 98–107)
CO2 SERPL-SCNC: 22.4 MMOL/L (ref 22–29)
CREAT SERPL-MCNC: 1.25 MG/DL (ref 0.76–1.27)
D DIMER PPP FEU-MCNC: 0.36 MCGFEU/ML (ref 0–0.5)
GFR SERPL CREATININE-BSD FRML MDRD: 62 ML/MIN/1.73
GLOBULIN UR ELPH-MCNC: 2.9 GM/DL
GLUCOSE SERPL-MCNC: 92 MG/DL (ref 65–99)
POTASSIUM SERPL-SCNC: 4.1 MMOL/L (ref 3.5–5.2)
PROT SERPL-MCNC: 7.3 G/DL (ref 6–8.5)
QT INTERVAL: 388 MS
QTC INTERVAL: 439 MS
SODIUM SERPL-SCNC: 142 MMOL/L (ref 136–145)

## 2021-12-07 PROCEDURE — 25010000002 METHYLPREDNISOLONE PER 40 MG: Performed by: EMERGENCY MEDICINE

## 2021-12-07 PROCEDURE — 96375 TX/PRO/DX INJ NEW DRUG ADDON: CPT

## 2021-12-07 PROCEDURE — 96365 THER/PROPH/DIAG IV INF INIT: CPT

## 2021-12-07 PROCEDURE — 25010000002 CEFTRIAXONE PER 250 MG: Performed by: EMERGENCY MEDICINE

## 2021-12-07 RX ADMIN — CEFTRIAXONE 1 G: 1 INJECTION, POWDER, FOR SOLUTION INTRAMUSCULAR; INTRAVENOUS at 00:19

## 2021-12-07 RX ADMIN — AZITHROMYCIN 500 MG: 250 TABLET, FILM COATED ORAL at 00:20

## 2021-12-07 RX ADMIN — METHYLPREDNISOLONE SODIUM SUCCINATE 80 MG: 40 INJECTION, POWDER, FOR SOLUTION INTRAMUSCULAR; INTRAVENOUS at 00:13

## 2021-12-07 NOTE — ED NOTES
I called [facility] about patient transfer, they only have two trucks so they need to decline.     Blas Fonseca  12/07/21 0056

## 2021-12-07 NOTE — ED NOTES
I called [facility] about transferring the patient.     Blas Fonseca  12/07/21 0029

## 2021-12-07 NOTE — ED PROVIDER NOTES
Subjective   46-year-old male presents with shortness of breath.  Symptoms began 2-3 days ago.  He reports chills and mildly productive cough.  He has shortness of breath and wheezing that been worsening during that time as well.  He reports intermittent episodes like this usually related to URI episodes.  He denies measured fever or known COVID-19 exposures.  He is not vaccinated.  He denies chest pain or other concerns at this time.      History provided by:  Patient   used: No        Review of Systems   Constitutional: Negative for fever.   HENT: Negative.    Eyes: Negative.    Respiratory: Positive for cough, shortness of breath and wheezing.    Cardiovascular: Negative.  Negative for chest pain.   Gastrointestinal: Negative.  Negative for abdominal pain.   Genitourinary: Negative.  Negative for dysuria.   Musculoskeletal: Negative.    Skin: Negative.    Neurological: Positive for weakness (R side from hemorrhagic CVA (baseline)).   Psychiatric/Behavioral: Negative.    All other systems reviewed and are negative.      Past Medical History:   Diagnosis Date   • Hypertension        Allergies   Allergen Reactions   • Codeine        No past surgical history on file.    No family history on file.    Social History     Socioeconomic History   • Marital status:    Tobacco Use   • Smoking status: Light Tobacco Smoker   • Smokeless tobacco: Never Used   Substance and Sexual Activity   • Alcohol use: No   • Drug use: No   • Sexual activity: Defer           Objective   Physical Exam  Vitals and nursing note reviewed.   Constitutional:       General: He is not in acute distress.     Appearance: He is well-developed. He is not diaphoretic.   HENT:      Head: Normocephalic and atraumatic.      Right Ear: External ear normal.      Left Ear: External ear normal.      Nose: Nose normal.   Eyes:      Conjunctiva/sclera: Conjunctivae normal.      Pupils: Pupils are equal, round, and reactive to light.    Neck:      Vascular: No JVD.      Trachea: No tracheal deviation.   Cardiovascular:      Rate and Rhythm: Normal rate and regular rhythm.      Heart sounds: Normal heart sounds. No murmur heard.      Pulmonary:      Effort: Pulmonary effort is normal. No respiratory distress.      Breath sounds: Decreased breath sounds and wheezing present.   Abdominal:      General: Bowel sounds are normal.      Palpations: Abdomen is soft.      Tenderness: There is no abdominal tenderness.   Musculoskeletal:         General: No deformity. Normal range of motion.      Cervical back: Normal range of motion and neck supple.   Skin:     General: Skin is warm and dry.      Coloration: Skin is not pale.      Findings: No erythema or rash.   Neurological:      Mental Status: He is alert and oriented to person, place, and time.      Cranial Nerves: No cranial nerve deficit.      Comments: R side weakness, dysmetria (baseline)   Psychiatric:         Behavior: Behavior normal.         Thought Content: Thought content normal.         Procedures       XR Chest 1 View   Final Result   No acute cardiopulmonary findings.      Signer Name: Dillan Mock MD    Signed: 12/6/2021 11:47 PM    Workstation Name: [facility]     [facility]            ED Course  ED Course as of 12/07/21 0054   Mon Dec 06, 2021   2317 EKG: Normal sinus rhythm, rate 77, , QRS 94, QTc 439, no STEMI. [DH]   e Dec 07, 2021   0037 No bed availability in [place] at this time. Discussed with Dr. Felipe, accepted for transfer to [place]. [DH]      ED Course User Index  [DH] Rah Flores MD                                                 [facility]    Final diagnoses:   COPD with acute exacerbation (HCC)   Acute respiratory failure with hypoxia (HCC)       ED Disposition  ED Disposition     ED Disposition Condition Comment    Transfer to Another Facility             No follow-up provider specified.       Medication List      No changes were  made to your prescriptions during this visit.          Rah Flores MD  12/07/21 0054

## 2021-12-07 NOTE — ED NOTES
Report called to RONAL Hidalgo @ [facility] at this time.     Nicki Hernandez RN  12/07/21 0050

## 2021-12-07 NOTE — ED NOTES
I called [facility] about patient transfer to [facility].     Blas Fonseca  12/07/21 0057

## 2021-12-07 NOTE — ED NOTES
Received called from Jannie at [facility], states patient has been accepted to [facility] going to [facility] floor. Number for report [phone].     Nicki Hernandez RN  12/07/21 0044

## 2021-12-07 NOTE — ED NOTES
I called [facility] to see if [facility] accepted patient transfer. [facility] is on it's way.     Blas Fonseca  12/07/21 0127

## 2021-12-11 LAB
BACTERIA SPEC AEROBE CULT: NORMAL
BACTERIA SPEC AEROBE CULT: NORMAL